# Patient Record
Sex: MALE | Race: WHITE | NOT HISPANIC OR LATINO | Employment: OTHER | URBAN - NONMETROPOLITAN AREA
[De-identification: names, ages, dates, MRNs, and addresses within clinical notes are randomized per-mention and may not be internally consistent; named-entity substitution may affect disease eponyms.]

---

## 2023-06-12 LAB
ALANINE AMINOTRANSFERASE (SGPT) (U/L) IN SER/PLAS: 49 U/L (ref 10–52)
ALBUMIN (G/DL) IN SER/PLAS: 4.3 G/DL (ref 3.4–5)
ALKALINE PHOSPHATASE (U/L) IN SER/PLAS: 53 U/L (ref 33–136)
ANION GAP IN SER/PLAS: 15 MMOL/L (ref 10–20)
ASPARTATE AMINOTRANSFERASE (SGOT) (U/L) IN SER/PLAS: 48 U/L (ref 9–39)
BILIRUBIN TOTAL (MG/DL) IN SER/PLAS: 0.5 MG/DL (ref 0–1.2)
CALCIUM (MG/DL) IN SER/PLAS: 9 MG/DL (ref 8.6–10.3)
CARBON DIOXIDE, TOTAL (MMOL/L) IN SER/PLAS: 28 MMOL/L (ref 21–32)
CHLORIDE (MMOL/L) IN SER/PLAS: 103 MMOL/L (ref 98–107)
CREATININE (MG/DL) IN SER/PLAS: 1.34 MG/DL (ref 0.5–1.3)
ERYTHROCYTE DISTRIBUTION WIDTH (RATIO) BY AUTOMATED COUNT: 13.4 % (ref 11.5–14.5)
ERYTHROCYTE MEAN CORPUSCULAR HEMOGLOBIN CONCENTRATION (G/DL) BY AUTOMATED: 32.7 G/DL (ref 32–36)
ERYTHROCYTE MEAN CORPUSCULAR VOLUME (FL) BY AUTOMATED COUNT: 99 FL (ref 80–100)
ERYTHROCYTES (10*6/UL) IN BLOOD BY AUTOMATED COUNT: 5.21 X10E12/L (ref 4.5–5.9)
GFR MALE: 55 ML/MIN/1.73M2
GLUCOSE (MG/DL) IN SER/PLAS: 196 MG/DL (ref 74–99)
HEMATOCRIT (%) IN BLOOD BY AUTOMATED COUNT: 51.3 % (ref 41–52)
HEMOGLOBIN (G/DL) IN BLOOD: 16.8 G/DL (ref 13.5–17.5)
LEUKOCYTES (10*3/UL) IN BLOOD BY AUTOMATED COUNT: 5.7 X10E9/L (ref 4.4–11.3)
PLATELETS (10*3/UL) IN BLOOD AUTOMATED COUNT: 198 X10E9/L (ref 150–450)
POTASSIUM (MMOL/L) IN SER/PLAS: 4.5 MMOL/L (ref 3.5–5.3)
PROTEIN TOTAL: 7.1 G/DL (ref 6.4–8.2)
SODIUM (MMOL/L) IN SER/PLAS: 141 MMOL/L (ref 136–145)
UREA NITROGEN (MG/DL) IN SER/PLAS: 23 MG/DL (ref 6–23)

## 2023-06-13 LAB
ESTIMATED AVERAGE GLUCOSE FOR HBA1C: 200 MG/DL
HEMOGLOBIN A1C/HEMOGLOBIN TOTAL IN BLOOD: 8.6 %

## 2023-06-16 PROBLEM — E11.37X1 TYPE 2 DIABETES MELLITUS WITH DIABETIC MACULAR EDEMA, RESOLVED FOLLOWING TREATMENT, RIGHT EYE (MULTI): Status: ACTIVE | Noted: 2023-06-16

## 2023-06-16 PROBLEM — R31.9 HEMATURIA: Status: ACTIVE | Noted: 2023-06-16

## 2023-06-16 PROBLEM — S73.192A LABRAL TEAR OF LEFT HIP JOINT: Status: ACTIVE | Noted: 2019-03-26

## 2023-06-16 PROBLEM — G47.30 SLEEP APNEA: Status: ACTIVE | Noted: 2023-06-16

## 2023-06-16 PROBLEM — E66.3 OVERWEIGHT: Status: ACTIVE | Noted: 2023-06-16

## 2023-06-16 PROBLEM — Z95.1 S/P CABG (CORONARY ARTERY BYPASS GRAFT): Status: ACTIVE | Noted: 2023-06-16

## 2023-06-16 PROBLEM — R06.02 SOB (SHORTNESS OF BREATH) ON EXERTION: Status: ACTIVE | Noted: 2017-10-23

## 2023-06-16 PROBLEM — I48.91 ATRIAL FIBRILLATION (MULTI): Status: ACTIVE | Noted: 2023-06-16

## 2023-06-16 PROBLEM — I10 HYPERTENSION: Status: ACTIVE | Noted: 2023-06-16

## 2023-06-16 PROBLEM — R42 DIZZY: Status: ACTIVE | Noted: 2018-07-16

## 2023-06-16 PROBLEM — T78.40XA ALLERGIES: Status: ACTIVE | Noted: 2023-06-16

## 2023-06-16 PROBLEM — R97.20 ELEVATED PSA: Status: ACTIVE | Noted: 2023-06-16

## 2023-06-16 PROBLEM — I48.0 PAROXYSMAL ATRIAL FIBRILLATION (MULTI): Status: ACTIVE | Noted: 2023-06-16

## 2023-06-16 PROBLEM — Z95.0 CARDIAC RESYNCHRONIZATION THERAPY PACEMAKER (CRT-P) IN PLACE: Status: ACTIVE | Noted: 2017-10-23

## 2023-06-16 PROBLEM — I25.10 ARTERIOSCLEROSIS OF CORONARY ARTERY: Status: ACTIVE | Noted: 2023-06-16

## 2023-06-16 PROBLEM — N13.8 BENIGN PROSTATIC HYPERPLASIA WITH URINARY OBSTRUCTION AND OTHER LOWER URINARY TRACT SYMPTOMS: Status: ACTIVE | Noted: 2023-06-16

## 2023-06-16 PROBLEM — R06.09 DOE (DYSPNEA ON EXERTION): Chronic | Status: ACTIVE | Noted: 2017-06-15

## 2023-06-16 PROBLEM — R25.2 SPASM: Status: ACTIVE | Noted: 2023-06-16

## 2023-06-16 PROBLEM — G62.9 NEUROPATHY: Status: ACTIVE | Noted: 2023-06-16

## 2023-06-16 PROBLEM — R35.1 NOCTURIA: Status: ACTIVE | Noted: 2023-06-16

## 2023-06-16 PROBLEM — K21.9 GERD (GASTROESOPHAGEAL REFLUX DISEASE): Status: ACTIVE | Noted: 2023-06-16

## 2023-06-16 PROBLEM — N52.01 ERECTILE DYSFUNCTION DUE TO ARTERIAL INSUFFICIENCY: Status: ACTIVE | Noted: 2021-09-23

## 2023-06-16 PROBLEM — N40.0 BPH (BENIGN PROSTATIC HYPERPLASIA): Status: ACTIVE | Noted: 2023-06-16

## 2023-06-16 PROBLEM — I44.2 COMPLETE HEART BLOCK (MULTI): Status: ACTIVE | Noted: 2019-11-08

## 2023-06-16 PROBLEM — R32 URINARY INCONTINENCE: Status: ACTIVE | Noted: 2023-06-16

## 2023-06-16 PROBLEM — I21.4 NSTEMI (NON-ST ELEVATED MYOCARDIAL INFARCTION) (MULTI): Status: ACTIVE | Noted: 2020-06-07

## 2023-06-16 PROBLEM — Z95.0 STATUS POST BIVENTRICULAR PACEMAKER: Status: ACTIVE | Noted: 2019-11-08

## 2023-06-16 PROBLEM — R39.15 URINARY URGENCY: Status: ACTIVE | Noted: 2023-06-16

## 2023-06-16 PROBLEM — R35.0 URINARY FREQUENCY: Status: ACTIVE | Noted: 2023-06-16

## 2023-06-16 PROBLEM — G47.33 OBSTRUCTIVE SLEEP APNEA SYNDROME: Status: ACTIVE | Noted: 2023-06-16

## 2023-06-16 PROBLEM — E11.9 DIABETES MELLITUS, TYPE 2 (MULTI): Status: ACTIVE | Noted: 2023-06-16

## 2023-06-16 PROBLEM — N40.1 BENIGN PROSTATIC HYPERPLASIA WITH URINARY OBSTRUCTION AND OTHER LOWER URINARY TRACT SYMPTOMS: Status: ACTIVE | Noted: 2023-06-16

## 2023-06-16 PROBLEM — E78.5 HYPERLIPIDEMIA: Status: ACTIVE | Noted: 2023-06-16

## 2023-06-16 PROBLEM — N52.9 ERECTILE DYSFUNCTION: Status: ACTIVE | Noted: 2023-06-16

## 2023-06-16 PROBLEM — C61 MALIGNANT NEOPLASM OF PROSTATE (MULTI): Status: ACTIVE | Noted: 2021-06-10

## 2023-06-16 RX ORDER — DAPAGLIFLOZIN 10 MG/1
1 TABLET, FILM COATED ORAL
COMMUNITY
Start: 2022-05-09 | End: 2023-06-19 | Stop reason: ALTCHOICE

## 2023-06-16 RX ORDER — RANOLAZINE 500 MG/1
1 TABLET, EXTENDED RELEASE ORAL EVERY 12 HOURS
COMMUNITY
End: 2023-07-03 | Stop reason: SDUPTHER

## 2023-06-16 RX ORDER — TRIAMCINOLONE ACETONIDE 1 MG/G
CREAM TOPICAL
COMMUNITY
Start: 2020-01-15 | End: 2023-09-18 | Stop reason: SDUPTHER

## 2023-06-16 RX ORDER — BUMETANIDE 1 MG/1
1 TABLET ORAL DAILY
COMMUNITY
Start: 2023-02-08 | End: 2023-07-03 | Stop reason: SDUPTHER

## 2023-06-16 RX ORDER — OMEPRAZOLE 20 MG/1
1 CAPSULE, DELAYED RELEASE ORAL DAILY
COMMUNITY
End: 2023-07-03 | Stop reason: SDUPTHER

## 2023-06-16 RX ORDER — EPINEPHRINE 0.3 MG/.3ML
0.3 INJECTION SUBCUTANEOUS AS NEEDED
COMMUNITY
Start: 2021-11-22

## 2023-06-16 RX ORDER — METOPROLOL TARTRATE 25 MG/1
1 TABLET, FILM COATED ORAL 2 TIMES DAILY
COMMUNITY
End: 2023-07-03 | Stop reason: SDUPTHER

## 2023-06-16 RX ORDER — CLOPIDOGREL BISULFATE 75 MG/1
1 TABLET ORAL DAILY
COMMUNITY
End: 2023-07-03 | Stop reason: SDUPTHER

## 2023-06-16 RX ORDER — GABAPENTIN 300 MG/1
1 CAPSULE ORAL 3 TIMES DAILY
COMMUNITY
End: 2023-07-03

## 2023-06-16 RX ORDER — CIPROFLOXACIN 500 MG/1
500 TABLET ORAL EVERY 12 HOURS
COMMUNITY
Start: 2022-12-31

## 2023-06-16 RX ORDER — NITROGLYCERIN 0.4 MG/1
1 TABLET SUBLINGUAL AS NEEDED
COMMUNITY

## 2023-06-16 RX ORDER — LOSARTAN POTASSIUM 25 MG/1
1 TABLET ORAL DAILY
COMMUNITY
End: 2023-09-18 | Stop reason: SDUPTHER

## 2023-06-16 RX ORDER — METFORMIN HYDROCHLORIDE 750 MG/1
2 TABLET, EXTENDED RELEASE ORAL DAILY
COMMUNITY
End: 2023-06-30 | Stop reason: SDUPTHER

## 2023-06-16 RX ORDER — ISOSORBIDE MONONITRATE 60 MG/1
1 TABLET, EXTENDED RELEASE ORAL DAILY
COMMUNITY
End: 2023-07-14 | Stop reason: SDUPTHER

## 2023-06-16 RX ORDER — GLIPIZIDE 5 MG/1
2 TABLET ORAL 2 TIMES DAILY
COMMUNITY
Start: 2021-11-02 | End: 2023-09-18 | Stop reason: SDUPTHER

## 2023-06-16 RX ORDER — IBUPROFEN 200 MG
CAPSULE ORAL 2 TIMES DAILY
COMMUNITY
Start: 2020-05-29 | End: 2024-04-25 | Stop reason: SDUPTHER

## 2023-06-16 RX ORDER — CYCLOBENZAPRINE HCL 10 MG
1 TABLET ORAL 3 TIMES DAILY PRN
COMMUNITY

## 2023-06-19 ENCOUNTER — OFFICE VISIT (OUTPATIENT)
Dept: PRIMARY CARE | Facility: CLINIC | Age: 78
End: 2023-06-19
Payer: MEDICARE

## 2023-06-19 VITALS
BODY MASS INDEX: 37.84 KG/M2 | HEIGHT: 70 IN | SYSTOLIC BLOOD PRESSURE: 114 MMHG | WEIGHT: 264.3 LBS | OXYGEN SATURATION: 96 % | DIASTOLIC BLOOD PRESSURE: 64 MMHG | HEART RATE: 70 BPM

## 2023-06-19 DIAGNOSIS — C61 MALIGNANT NEOPLASM OF PROSTATE (MULTI): ICD-10-CM

## 2023-06-19 DIAGNOSIS — Z95.0 CARDIAC RESYNCHRONIZATION THERAPY PACEMAKER (CRT-P) IN PLACE: ICD-10-CM

## 2023-06-19 DIAGNOSIS — M17.11 ARTHRITIS OF RIGHT KNEE: ICD-10-CM

## 2023-06-19 DIAGNOSIS — E78.2 MIXED HYPERLIPIDEMIA: ICD-10-CM

## 2023-06-19 DIAGNOSIS — Z79.4 TYPE 2 DIABETES MELLITUS WITH DIABETIC MACULAR EDEMA OF RIGHT EYE RESOLVED AFTER TREATMENT, WITH LONG-TERM CURRENT USE OF INSULIN (MULTI): ICD-10-CM

## 2023-06-19 DIAGNOSIS — E11.37X1 TYPE 2 DIABETES MELLITUS WITH DIABETIC MACULAR EDEMA OF RIGHT EYE RESOLVED AFTER TREATMENT, WITH LONG-TERM CURRENT USE OF INSULIN (MULTI): ICD-10-CM

## 2023-06-19 DIAGNOSIS — I48.0 PAROXYSMAL ATRIAL FIBRILLATION (MULTI): Primary | ICD-10-CM

## 2023-06-19 PROCEDURE — 3078F DIAST BP <80 MM HG: CPT | Performed by: FAMILY MEDICINE

## 2023-06-19 PROCEDURE — 99214 OFFICE O/P EST MOD 30 MIN: CPT | Performed by: FAMILY MEDICINE

## 2023-06-19 PROCEDURE — 1160F RVW MEDS BY RX/DR IN RCRD: CPT | Performed by: FAMILY MEDICINE

## 2023-06-19 PROCEDURE — 3074F SYST BP LT 130 MM HG: CPT | Performed by: FAMILY MEDICINE

## 2023-06-19 PROCEDURE — 1159F MED LIST DOCD IN RCRD: CPT | Performed by: FAMILY MEDICINE

## 2023-06-19 RX ORDER — ALIROCUMAB 75 MG/ML
1 INJECTION, SOLUTION SUBCUTANEOUS
COMMUNITY
Start: 2022-07-01

## 2023-06-19 RX ORDER — ALBUTEROL SULFATE 90 UG/1
2 AEROSOL, METERED RESPIRATORY (INHALATION) 2 TIMES DAILY
COMMUNITY
Start: 2022-06-24

## 2023-06-19 RX ORDER — DAPAGLIFLOZIN 5 MG/1
5 TABLET, FILM COATED ORAL DAILY
COMMUNITY
End: 2023-12-11 | Stop reason: SDUPTHER

## 2023-06-19 RX ORDER — DICLOFENAC SODIUM 10 MG/G
GEL TOPICAL
COMMUNITY
Start: 2023-05-12 | End: 2023-06-19 | Stop reason: SDUPTHER

## 2023-06-19 RX ORDER — DICLOFENAC SODIUM 10 MG/G
4 GEL TOPICAL 2 TIMES DAILY PRN
Qty: 100 G | Refills: 2 | Status: SHIPPED | OUTPATIENT
Start: 2023-06-19

## 2023-06-19 RX ORDER — INSULIN DETEMIR 100 [IU]/ML
60 INJECTION, SOLUTION SUBCUTANEOUS EVERY MORNING
COMMUNITY
Start: 2023-06-04 | End: 2023-11-10 | Stop reason: SDUPTHER

## 2023-06-19 NOTE — PROGRESS NOTES
"Subjective   Patient ID: Shankar Oswald is a 78 y.o. male who presents for Follow-up (4 mo rev labs).    HPI   Dm Avg 170s, no lows, currently on Lantus 60 units and oral agents including Farxiga glipizide metformin.  Sugars have been very good lately and is falling A1c may be lacking his current status.  As such plan for A1c in 3 months  Cad no angina but he is noted to have decreasing exercise tolerance and capacity over the last 6 months and over the last 3 months.  At this point sometimes that he has to stop and 50 feet because of pain in both hips as well as shortness of breath.  He is to call cardiology to be seen this week or next week a he has an echo most recently showing a normal ejection fraction though he does have more than a dozen stents in place.  Although he is having no chest pain this decreased capacity and tolerance with associated dyspnea likely represents occlusive disease.   GERD-Takes PPI daily with no breakthrough symptoms.  Reviewed dietary, caffeine, tobacco, alcohol, and NSAID avoidance. No dyspepsia, dysphagia, reflux, melena, or abdominal pain.  Paroxysmal atrial fibrillation managed with pacer  Hyperlipidemia on Praluent injection as intolerant to statin  Review of Systems  As per HPI  Objective   /64   Pulse 70   Ht 1.778 m (5' 10\")   Wt 120 kg (264 lb 4.8 oz)   SpO2 96%   BMI 37.92 kg/m²     Physical Exam  No bruit thyroid nontender.  Heart appears regular.  Lungs clear.  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

## 2023-06-30 DIAGNOSIS — E11.37X1 TYPE 2 DIABETES MELLITUS WITH DIABETIC MACULAR EDEMA OF RIGHT EYE RESOLVED AFTER TREATMENT, WITH LONG-TERM CURRENT USE OF INSULIN (MULTI): ICD-10-CM

## 2023-06-30 DIAGNOSIS — Z79.4 TYPE 2 DIABETES MELLITUS WITH DIABETIC MACULAR EDEMA OF RIGHT EYE RESOLVED AFTER TREATMENT, WITH LONG-TERM CURRENT USE OF INSULIN (MULTI): ICD-10-CM

## 2023-06-30 RX ORDER — METFORMIN HYDROCHLORIDE 750 MG/1
1500 TABLET, EXTENDED RELEASE ORAL DAILY
Qty: 180 TABLET | Refills: 3 | Status: SHIPPED | OUTPATIENT
Start: 2023-06-30 | End: 2023-09-18 | Stop reason: SDUPTHER

## 2023-07-02 DIAGNOSIS — G62.9 POLYNEUROPATHY, UNSPECIFIED: ICD-10-CM

## 2023-07-02 DIAGNOSIS — I10 HYPERTENSION, UNSPECIFIED TYPE: ICD-10-CM

## 2023-07-02 DIAGNOSIS — K21.9 GASTROESOPHAGEAL REFLUX DISEASE, UNSPECIFIED WHETHER ESOPHAGITIS PRESENT: ICD-10-CM

## 2023-07-02 DIAGNOSIS — Z00.00 ROUTINE HEALTH MAINTENANCE: ICD-10-CM

## 2023-07-02 DIAGNOSIS — I25.10 ARTERIOSCLEROSIS OF CORONARY ARTERY: ICD-10-CM

## 2023-07-03 RX ORDER — METOPROLOL TARTRATE 25 MG/1
25 TABLET, FILM COATED ORAL 2 TIMES DAILY
Qty: 180 TABLET | Refills: 3 | Status: SHIPPED | OUTPATIENT
Start: 2023-07-03 | End: 2023-09-18 | Stop reason: SDUPTHER

## 2023-07-03 RX ORDER — RANOLAZINE 500 MG/1
500 TABLET, EXTENDED RELEASE ORAL EVERY 12 HOURS
Qty: 180 TABLET | Refills: 3 | Status: SHIPPED | OUTPATIENT
Start: 2023-07-03 | End: 2023-09-18 | Stop reason: SDUPTHER

## 2023-07-03 RX ORDER — GABAPENTIN 300 MG/1
CAPSULE ORAL
Qty: 270 CAPSULE | Refills: 3 | Status: SHIPPED | OUTPATIENT
Start: 2023-07-03 | End: 2023-09-18 | Stop reason: SDUPTHER

## 2023-07-03 RX ORDER — BUMETANIDE 1 MG/1
1 TABLET ORAL DAILY
Qty: 90 TABLET | Refills: 3 | Status: SHIPPED | OUTPATIENT
Start: 2023-07-03 | End: 2023-09-18 | Stop reason: SDUPTHER

## 2023-07-03 RX ORDER — OMEPRAZOLE 20 MG/1
20 CAPSULE, DELAYED RELEASE ORAL DAILY
Qty: 90 CAPSULE | Refills: 3 | Status: SHIPPED | OUTPATIENT
Start: 2023-07-03 | End: 2023-09-18 | Stop reason: SDUPTHER

## 2023-07-03 RX ORDER — CLOPIDOGREL BISULFATE 75 MG/1
75 TABLET ORAL DAILY
Qty: 90 TABLET | Refills: 3 | Status: SHIPPED | OUTPATIENT
Start: 2023-07-03 | End: 2023-09-18 | Stop reason: SDUPTHER

## 2023-07-14 DIAGNOSIS — I48.0 PAROXYSMAL ATRIAL FIBRILLATION (MULTI): ICD-10-CM

## 2023-07-14 RX ORDER — ISOSORBIDE MONONITRATE 60 MG/1
60 TABLET, EXTENDED RELEASE ORAL DAILY
Qty: 90 TABLET | Refills: 3 | Status: SHIPPED | OUTPATIENT
Start: 2023-07-14 | End: 2023-09-18 | Stop reason: SDUPTHER

## 2023-09-11 ENCOUNTER — LAB (OUTPATIENT)
Dept: LAB | Facility: LAB | Age: 78
End: 2023-09-11
Payer: MEDICARE

## 2023-09-11 DIAGNOSIS — E78.2 MIXED HYPERLIPIDEMIA: ICD-10-CM

## 2023-09-11 DIAGNOSIS — Z79.4 TYPE 2 DIABETES MELLITUS WITH DIABETIC MACULAR EDEMA OF RIGHT EYE RESOLVED AFTER TREATMENT, WITH LONG-TERM CURRENT USE OF INSULIN (MULTI): ICD-10-CM

## 2023-09-11 DIAGNOSIS — E11.37X1 TYPE 2 DIABETES MELLITUS WITH DIABETIC MACULAR EDEMA OF RIGHT EYE RESOLVED AFTER TREATMENT, WITH LONG-TERM CURRENT USE OF INSULIN (MULTI): ICD-10-CM

## 2023-09-11 LAB
ALANINE AMINOTRANSFERASE (SGPT) (U/L) IN SER/PLAS: 44 U/L (ref 10–52)
ALBUMIN (G/DL) IN SER/PLAS: 4.2 G/DL (ref 3.4–5)
ALKALINE PHOSPHATASE (U/L) IN SER/PLAS: 55 U/L (ref 33–136)
ANION GAP IN SER/PLAS: 15 MMOL/L (ref 10–20)
ASPARTATE AMINOTRANSFERASE (SGOT) (U/L) IN SER/PLAS: 43 U/L (ref 9–39)
BILIRUBIN TOTAL (MG/DL) IN SER/PLAS: 0.4 MG/DL (ref 0–1.2)
CALCIUM (MG/DL) IN SER/PLAS: 9.4 MG/DL (ref 8.6–10.3)
CARBON DIOXIDE, TOTAL (MMOL/L) IN SER/PLAS: 25 MMOL/L (ref 21–32)
CHLORIDE (MMOL/L) IN SER/PLAS: 104 MMOL/L (ref 98–107)
CHOLESTEROL (MG/DL) IN SER/PLAS: 194 MG/DL (ref 0–199)
CHOLESTEROL IN HDL (MG/DL) IN SER/PLAS: 40 MG/DL
CHOLESTEROL/HDL RATIO: 4.9
CREATININE (MG/DL) IN SER/PLAS: 1.18 MG/DL (ref 0.5–1.3)
ERYTHROCYTE DISTRIBUTION WIDTH (RATIO) BY AUTOMATED COUNT: 14.4 % (ref 11.5–14.5)
ERYTHROCYTE MEAN CORPUSCULAR HEMOGLOBIN CONCENTRATION (G/DL) BY AUTOMATED: 32.3 G/DL (ref 32–36)
ERYTHROCYTE MEAN CORPUSCULAR VOLUME (FL) BY AUTOMATED COUNT: 100 FL (ref 80–100)
ERYTHROCYTES (10*6/UL) IN BLOOD BY AUTOMATED COUNT: 5.05 X10E12/L (ref 4.5–5.9)
ESTIMATED AVERAGE GLUCOSE FOR HBA1C: 194 MG/DL
GFR MALE: 64 ML/MIN/1.73M2
GLUCOSE (MG/DL) IN SER/PLAS: 163 MG/DL (ref 74–99)
HEMATOCRIT (%) IN BLOOD BY AUTOMATED COUNT: 50.7 % (ref 41–52)
HEMOGLOBIN (G/DL) IN BLOOD: 16.4 G/DL (ref 13.5–17.5)
HEMOGLOBIN A1C/HEMOGLOBIN TOTAL IN BLOOD: 8.4 %
LDL: 85 MG/DL (ref 0–99)
LEUKOCYTES (10*3/UL) IN BLOOD BY AUTOMATED COUNT: 5.7 X10E9/L (ref 4.4–11.3)
NON HDL CHOLESTEROL: 154 MG/DL
PLATELETS (10*3/UL) IN BLOOD AUTOMATED COUNT: 189 X10E9/L (ref 150–450)
POTASSIUM (MMOL/L) IN SER/PLAS: 4.5 MMOL/L (ref 3.5–5.3)
PROTEIN TOTAL: 7.1 G/DL (ref 6.4–8.2)
SODIUM (MMOL/L) IN SER/PLAS: 139 MMOL/L (ref 136–145)
TRIGLYCERIDE (MG/DL) IN SER/PLAS: 347 MG/DL (ref 0–149)
UREA NITROGEN (MG/DL) IN SER/PLAS: 20 MG/DL (ref 6–23)
VLDL: 69 MG/DL (ref 0–40)

## 2023-09-11 PROCEDURE — 80061 LIPID PANEL: CPT

## 2023-09-11 PROCEDURE — 85027 COMPLETE CBC AUTOMATED: CPT

## 2023-09-11 PROCEDURE — 36415 COLL VENOUS BLD VENIPUNCTURE: CPT

## 2023-09-11 PROCEDURE — 80053 COMPREHEN METABOLIC PANEL: CPT

## 2023-09-11 PROCEDURE — 83036 HEMOGLOBIN GLYCOSYLATED A1C: CPT

## 2023-09-18 ENCOUNTER — OFFICE VISIT (OUTPATIENT)
Dept: PRIMARY CARE | Facility: CLINIC | Age: 78
End: 2023-09-18
Payer: MEDICARE

## 2023-09-18 ENCOUNTER — APPOINTMENT (OUTPATIENT)
Dept: URBAN - METROPOLITAN AREA CLINIC 204 | Age: 78
Setting detail: DERMATOLOGY
End: 2023-09-19

## 2023-09-18 VITALS
SYSTOLIC BLOOD PRESSURE: 120 MMHG | WEIGHT: 269.4 LBS | HEIGHT: 70 IN | BODY MASS INDEX: 38.57 KG/M2 | OXYGEN SATURATION: 97 % | DIASTOLIC BLOOD PRESSURE: 62 MMHG | HEART RATE: 70 BPM

## 2023-09-18 DIAGNOSIS — E11.37X1 TYPE 2 DIABETES MELLITUS WITH DIABETIC MACULAR EDEMA OF RIGHT EYE RESOLVED AFTER TREATMENT, WITH LONG-TERM CURRENT USE OF INSULIN (MULTI): ICD-10-CM

## 2023-09-18 DIAGNOSIS — I25.10 ARTERIOSCLEROSIS OF CORONARY ARTERY: ICD-10-CM

## 2023-09-18 DIAGNOSIS — I48.0 PAROXYSMAL ATRIAL FIBRILLATION (MULTI): ICD-10-CM

## 2023-09-18 DIAGNOSIS — Z00.00 ROUTINE HEALTH MAINTENANCE: ICD-10-CM

## 2023-09-18 DIAGNOSIS — R21 RASH: Primary | ICD-10-CM

## 2023-09-18 DIAGNOSIS — G62.9 POLYNEUROPATHY, UNSPECIFIED: ICD-10-CM

## 2023-09-18 DIAGNOSIS — K21.9 GASTROESOPHAGEAL REFLUX DISEASE, UNSPECIFIED WHETHER ESOPHAGITIS PRESENT: ICD-10-CM

## 2023-09-18 DIAGNOSIS — I10 HYPERTENSION, UNSPECIFIED TYPE: ICD-10-CM

## 2023-09-18 DIAGNOSIS — Z79.4 TYPE 2 DIABETES MELLITUS WITH DIABETIC MACULAR EDEMA OF RIGHT EYE RESOLVED AFTER TREATMENT, WITH LONG-TERM CURRENT USE OF INSULIN (MULTI): ICD-10-CM

## 2023-09-18 DIAGNOSIS — M17.11 ARTHRITIS OF RIGHT KNEE: ICD-10-CM

## 2023-09-18 DIAGNOSIS — E78.2 MIXED HYPERLIPIDEMIA: ICD-10-CM

## 2023-09-18 PROCEDURE — OTHER MIPS QUALITY: OTHER

## 2023-09-18 PROCEDURE — 1160F RVW MEDS BY RX/DR IN RCRD: CPT | Performed by: FAMILY MEDICINE

## 2023-09-18 PROCEDURE — 3074F SYST BP LT 130 MM HG: CPT | Performed by: FAMILY MEDICINE

## 2023-09-18 PROCEDURE — 1159F MED LIST DOCD IN RCRD: CPT | Performed by: FAMILY MEDICINE

## 2023-09-18 PROCEDURE — 17003 DESTRUCT PREMALG LES 2-14: CPT | Mod: 59

## 2023-09-18 PROCEDURE — 99213 OFFICE O/P EST LOW 20 MIN: CPT | Mod: 25

## 2023-09-18 PROCEDURE — 17110 DESTRUCT B9 LESION 1-14: CPT

## 2023-09-18 PROCEDURE — 3078F DIAST BP <80 MM HG: CPT | Performed by: FAMILY MEDICINE

## 2023-09-18 PROCEDURE — 17000 DESTRUCT PREMALG LESION: CPT | Mod: 59

## 2023-09-18 PROCEDURE — 99214 OFFICE O/P EST MOD 30 MIN: CPT | Performed by: FAMILY MEDICINE

## 2023-09-18 RX ORDER — LOSARTAN POTASSIUM 25 MG/1
25 TABLET ORAL DAILY
Qty: 90 TABLET | Refills: 3 | Status: SHIPPED | OUTPATIENT
Start: 2023-09-18 | End: 2024-09-17

## 2023-09-18 RX ORDER — BUMETANIDE 1 MG/1
1 TABLET ORAL DAILY
Qty: 90 TABLET | Refills: 3 | Status: SHIPPED | OUTPATIENT
Start: 2023-09-18 | End: 2024-09-17

## 2023-09-18 RX ORDER — RANOLAZINE 500 MG/1
500 TABLET, EXTENDED RELEASE ORAL EVERY 12 HOURS
Qty: 180 TABLET | Refills: 3 | Status: SHIPPED | OUTPATIENT
Start: 2023-09-18 | End: 2024-09-17

## 2023-09-18 RX ORDER — GLIPIZIDE 5 MG/1
10 TABLET ORAL 2 TIMES DAILY
Qty: 360 TABLET | Refills: 3 | Status: SHIPPED | OUTPATIENT
Start: 2023-09-18 | End: 2024-09-17

## 2023-09-18 RX ORDER — WARFARIN 2.5 MG/1
2.5 TABLET ORAL EVERY EVENING
Qty: 90 TABLET | Refills: 3 | Status: SHIPPED | OUTPATIENT
Start: 2023-09-18 | End: 2024-09-17

## 2023-09-18 RX ORDER — METFORMIN HYDROCHLORIDE 750 MG/1
1500 TABLET, EXTENDED RELEASE ORAL DAILY
Qty: 180 TABLET | Refills: 3 | Status: SHIPPED | OUTPATIENT
Start: 2023-09-18 | End: 2024-09-17

## 2023-09-18 RX ORDER — GABAPENTIN 300 MG/1
300 CAPSULE ORAL 3 TIMES DAILY
Qty: 270 CAPSULE | Refills: 3 | Status: SHIPPED | OUTPATIENT
Start: 2023-09-18 | End: 2024-09-17

## 2023-09-18 RX ORDER — CLOPIDOGREL BISULFATE 75 MG/1
75 TABLET ORAL DAILY
Qty: 90 TABLET | Refills: 3 | Status: SHIPPED | OUTPATIENT
Start: 2023-09-18 | End: 2024-09-17

## 2023-09-18 RX ORDER — METOPROLOL TARTRATE 25 MG/1
25 TABLET, FILM COATED ORAL 2 TIMES DAILY
Qty: 180 TABLET | Refills: 3 | Status: SHIPPED | OUTPATIENT
Start: 2023-09-18 | End: 2024-09-17

## 2023-09-18 RX ORDER — OMEPRAZOLE 20 MG/1
20 CAPSULE, DELAYED RELEASE ORAL DAILY
Qty: 90 CAPSULE | Refills: 3 | Status: SHIPPED | OUTPATIENT
Start: 2023-09-18 | End: 2024-09-17

## 2023-09-18 RX ORDER — WARFARIN 2.5 MG/1
2.5 TABLET ORAL EVERY EVENING
COMMUNITY
Start: 2022-08-29 | End: 2023-09-18 | Stop reason: SDUPTHER

## 2023-09-18 RX ORDER — WARFARIN SODIUM 5 MG/1
5 TABLET ORAL EVERY OTHER DAY
COMMUNITY
End: 2023-09-18 | Stop reason: SDUPTHER

## 2023-09-18 RX ORDER — WARFARIN SODIUM 5 MG/1
TABLET ORAL
Qty: 60 TABLET | Refills: 11 | Status: SHIPPED | OUTPATIENT
Start: 2023-09-18

## 2023-09-18 RX ORDER — ISOSORBIDE MONONITRATE 60 MG/1
60 TABLET, EXTENDED RELEASE ORAL DAILY
Qty: 90 TABLET | Refills: 3 | Status: SHIPPED | OUTPATIENT
Start: 2023-09-18 | End: 2024-09-17

## 2023-09-18 RX ORDER — TRIAMCINOLONE ACETONIDE 1 MG/G
CREAM TOPICAL 3 TIMES DAILY
Qty: 453.6 G | Refills: 11 | Status: SHIPPED | OUTPATIENT
Start: 2023-09-18 | End: 2024-09-17

## 2023-09-18 NOTE — PROCEDURE: MIPS QUALITY
Detail Level: Detailed
Additional Notes: Documentation for MIPS  purposes only. Full Patient visit note from paper chart is available for review and also scanned in EMA chart.
Quality 226: Preventive Care And Screening: Tobacco Use: Screening And Cessation Intervention: Patient screened for tobacco use, is a smoker AND received Cessation Counseling within the Previous 12 Months
Additional Notes: Patient received verbal cessation counseling (3 min or less)

## 2023-09-18 NOTE — PROGRESS NOTES
"Subjective   Patient ID: Shankar Oswald is a 78 y.o. male who presents for Follow-up (3 mo rev labs).    HPI   Since the last office visit there have been no interval operations, hospitalizations, important illnesses or injuries.    Dm- bs avg 153, treneding down , n1 month agoavg 180  He gets farxiga for free at 5mg.  A1c remains at 8.4  Review of Systems  Has stable reduced exercise tolerance and capacity from known heart disease  Objective   /62   Pulse 70   Ht 1.778 m (5' 10\")   Wt 122 kg (269 lb 6.4 oz)   SpO2 97%   BMI 38.65 kg/m²     Physical Exam  Heart regular lungs clear abdomen obese  Assessment/Plan   Problem List Items Addressed This Visit       Arteriosclerosis of coronary artery    Relevant Medications    clopidogrel (Plavix) 75 mg tablet    isosorbide mononitrate ER (Imdur) 60 mg 24 hr tablet    metoprolol tartrate (Lopressor) 25 mg tablet    ranolazine (Ranexa) 500 mg 12 hr tablet    Paroxysmal atrial fibrillation (CMS/HCC)    Relevant Medications    clopidogrel (Plavix) 75 mg tablet    isosorbide mononitrate ER (Imdur) 60 mg 24 hr tablet    metoprolol tartrate (Lopressor) 25 mg tablet    ranolazine (Ranexa) 500 mg 12 hr tablet    warfarin (Coumadin) 2.5 mg tablet    warfarin (Coumadin) 5 mg tablet    Diabetes mellitus, type 2 (CMS/HCC)    Relevant Medications    metFORMIN XR (Glucophage-XR) 750 mg 24 hr tablet    glipiZIDE (Glucotrol) 5 mg tablet    Other Relevant Orders    Hemoglobin A1C    Follow Up In Primary Care    GERD (gastroesophageal reflux disease)    Relevant Medications    omeprazole (PriLOSEC) 20 mg DR capsule    Hyperlipidemia    Hypertension    Relevant Medications    bumetanide (Bumex) 1 mg tablet    metoprolol tartrate (Lopressor) 25 mg tablet    losartan (Cozaar) 25 mg tablet     Other Visit Diagnoses       Rash    -  Primary    Relevant Medications    triamcinolone (Kenalog) 0.1 % cream    Arthritis of right knee        Polyneuropathy, unspecified        Relevant " Medications    gabapentin (Neurontin) 300 mg capsule    Routine health maintenance        Relevant Medications    ranolazine (Ranexa) 500 mg 12 hr tablet

## 2023-11-10 DIAGNOSIS — E11.37X1 TYPE 2 DIABETES MELLITUS WITH DIABETIC MACULAR EDEMA OF RIGHT EYE RESOLVED AFTER TREATMENT, WITH LONG-TERM CURRENT USE OF INSULIN (MULTI): ICD-10-CM

## 2023-11-10 DIAGNOSIS — Z79.4 TYPE 2 DIABETES MELLITUS WITH DIABETIC MACULAR EDEMA OF RIGHT EYE RESOLVED AFTER TREATMENT, WITH LONG-TERM CURRENT USE OF INSULIN (MULTI): ICD-10-CM

## 2023-11-10 RX ORDER — INSULIN DETEMIR 100 [IU]/ML
60 INJECTION, SOLUTION SUBCUTANEOUS EVERY MORNING
Qty: 54 ML | Refills: 3 | Status: SHIPPED | OUTPATIENT
Start: 2023-11-10 | End: 2024-02-26 | Stop reason: WASHOUT

## 2023-12-11 DIAGNOSIS — E11.37X1 TYPE 2 DIABETES MELLITUS WITH DIABETIC MACULAR EDEMA OF RIGHT EYE RESOLVED AFTER TREATMENT, WITH LONG-TERM CURRENT USE OF INSULIN (MULTI): ICD-10-CM

## 2023-12-11 DIAGNOSIS — Z79.4 TYPE 2 DIABETES MELLITUS WITH DIABETIC MACULAR EDEMA OF RIGHT EYE RESOLVED AFTER TREATMENT, WITH LONG-TERM CURRENT USE OF INSULIN (MULTI): ICD-10-CM

## 2023-12-11 RX ORDER — DAPAGLIFLOZIN 10 MG/1
10 TABLET, FILM COATED ORAL DAILY
Qty: 90 TABLET | Refills: 3 | Status: SHIPPED | OUTPATIENT
Start: 2023-12-11 | End: 2024-12-10

## 2023-12-15 ENCOUNTER — LAB (OUTPATIENT)
Dept: LAB | Facility: LAB | Age: 78
End: 2023-12-15
Payer: MEDICARE

## 2023-12-15 DIAGNOSIS — E11.37X1 TYPE 2 DIABETES MELLITUS WITH DIABETIC MACULAR EDEMA OF RIGHT EYE RESOLVED AFTER TREATMENT, WITH LONG-TERM CURRENT USE OF INSULIN (MULTI): ICD-10-CM

## 2023-12-15 DIAGNOSIS — Z79.4 TYPE 2 DIABETES MELLITUS WITH DIABETIC MACULAR EDEMA OF RIGHT EYE RESOLVED AFTER TREATMENT, WITH LONG-TERM CURRENT USE OF INSULIN (MULTI): ICD-10-CM

## 2023-12-15 LAB
EST. AVERAGE GLUCOSE BLD GHB EST-MCNC: 177 MG/DL
HBA1C MFR BLD: 7.8 %

## 2023-12-15 PROCEDURE — 83036 HEMOGLOBIN GLYCOSYLATED A1C: CPT

## 2023-12-15 PROCEDURE — 36415 COLL VENOUS BLD VENIPUNCTURE: CPT

## 2023-12-22 ENCOUNTER — OFFICE VISIT (OUTPATIENT)
Dept: PRIMARY CARE | Facility: CLINIC | Age: 78
End: 2023-12-22
Payer: MEDICARE

## 2023-12-22 VITALS
OXYGEN SATURATION: 96 % | BODY MASS INDEX: 38.84 KG/M2 | HEART RATE: 71 BPM | SYSTOLIC BLOOD PRESSURE: 136 MMHG | HEIGHT: 70 IN | DIASTOLIC BLOOD PRESSURE: 76 MMHG | WEIGHT: 271.3 LBS

## 2023-12-22 DIAGNOSIS — I10 PRIMARY HYPERTENSION: ICD-10-CM

## 2023-12-22 DIAGNOSIS — E11.37X1 TYPE 2 DIABETES MELLITUS WITH DIABETIC MACULAR EDEMA OF RIGHT EYE RESOLVED AFTER TREATMENT, WITH LONG-TERM CURRENT USE OF INSULIN (MULTI): ICD-10-CM

## 2023-12-22 DIAGNOSIS — Z79.4 TYPE 2 DIABETES MELLITUS WITH DIABETIC MACULAR EDEMA OF RIGHT EYE RESOLVED AFTER TREATMENT, WITH LONG-TERM CURRENT USE OF INSULIN (MULTI): ICD-10-CM

## 2023-12-22 DIAGNOSIS — E78.2 MIXED HYPERLIPIDEMIA: ICD-10-CM

## 2023-12-22 DIAGNOSIS — Z00.00 ROUTINE GENERAL MEDICAL EXAMINATION AT HEALTH CARE FACILITY: Primary | ICD-10-CM

## 2023-12-22 PROCEDURE — 99214 OFFICE O/P EST MOD 30 MIN: CPT | Performed by: FAMILY MEDICINE

## 2023-12-22 PROCEDURE — 3075F SYST BP GE 130 - 139MM HG: CPT | Performed by: FAMILY MEDICINE

## 2023-12-22 PROCEDURE — G0439 PPPS, SUBSEQ VISIT: HCPCS | Performed by: FAMILY MEDICINE

## 2023-12-22 PROCEDURE — 1170F FXNL STATUS ASSESSED: CPT | Performed by: FAMILY MEDICINE

## 2023-12-22 PROCEDURE — 3078F DIAST BP <80 MM HG: CPT | Performed by: FAMILY MEDICINE

## 2023-12-22 PROCEDURE — 1159F MED LIST DOCD IN RCRD: CPT | Performed by: FAMILY MEDICINE

## 2023-12-22 PROCEDURE — 1160F RVW MEDS BY RX/DR IN RCRD: CPT | Performed by: FAMILY MEDICINE

## 2023-12-22 RX ORDER — INSULIN GLARGINE 100 [IU]/ML
60 INJECTION, SOLUTION SUBCUTANEOUS EVERY MORNING
Qty: 54 ML | Refills: 3 | Status: SHIPPED | OUTPATIENT
Start: 2023-12-22 | End: 2024-12-21

## 2023-12-22 ASSESSMENT — ACTIVITIES OF DAILY LIVING (ADL)
TAKING_MEDICATION: INDEPENDENT
DOING_HOUSEWORK: INDEPENDENT
DRESSING: INDEPENDENT
BATHING: INDEPENDENT
MANAGING_FINANCES: INDEPENDENT
GROCERY_SHOPPING: INDEPENDENT

## 2023-12-22 ASSESSMENT — ENCOUNTER SYMPTOMS
DEPRESSION: 0
OCCASIONAL FEELINGS OF UNSTEADINESS: 0
LOSS OF SENSATION IN FEET: 1

## 2023-12-22 ASSESSMENT — PATIENT HEALTH QUESTIONNAIRE - PHQ9
2. FEELING DOWN, DEPRESSED OR HOPELESS: NOT AT ALL
SUM OF ALL RESPONSES TO PHQ9 QUESTIONS 1 AND 2: 0
1. LITTLE INTEREST OR PLEASURE IN DOING THINGS: NOT AT ALL

## 2023-12-22 NOTE — PROGRESS NOTES
"Subjective   Reason for Visit: Shankar Oswald is an 78 y.o. male here for a Medicare Wellness visit.     Past Medical, Surgical, and Family History reviewed and updated in chart.    Reviewed all medications by prescribing practitioner or clinical pharmacist (such as prescriptions, OTCs, herbal therapies and supplements) and documented in the medical record.    HPI fastings are typically around 110 his 30-day average is one 4090-day average 150  Heart disease managed by [facility], no hospitalizations.  He is limited and walking capacity from a both cardiac and musculoskeletal basis.  Walking across the store to the restroom he has to stop 2 times because of hip pain.  Cardiology is referring to pulmonary to make sure there is not a contribution  Hyperlipidemia- on is on P CS K9 inhibitor and prudent diet  HTN-Takes and tolerates meds without side effects. No alcohol. + tobacco. no exercise. low salt.  Reviewed recommendation for 150 minutes of exercise per week including 2 days of weight training if over age 50    Patient Care Team:  Bradford Wheeler MD as PCP - General  Bradford Wheeler MD [facility]     Review of Systems  As per HPI  Objective   Vitals:  /76   Pulse 71   Ht 1.778 m (5' 10\")   Wt 123 kg (271 lb 4.8 oz)   SpO2 96%   BMI 38.93 kg/m²       Physical Exam  Lungs clear to auscultation.  Heart is regular.  No edema in extremities.  No carotid bruit.  Thyroid palpates normal.  Assessment/Plan   Problem List Items Addressed This Visit       Diabetes mellitus, type 2 (CMS/[facility])    Relevant Medications    insulin glargine (Basaglar KwikPen U-100 Insulin) 100 unit/mL (3 mL) pen    Other Relevant Orders    Hemoglobin A1C    Follow Up In Primary Care    Hyperlipidemia    Overview     Formatting of this note might be different from the original. intolerant to statins Last Assessment & Plan: Formatting of this note might be different from the original. Apparently statin " intolerant.  Would recommend a PCSK9 inhibitor.         Hypertension    Overview     Last Assessment & Plan: Formatting of this note might be different from the original. Patient is on BB, ACE, nitrate and diuretic.          Other Visit Diagnoses       Routine general medical examination at health care facility    -  Primary

## 2024-02-16 ENCOUNTER — PATIENT OUTREACH (OUTPATIENT)
Dept: CARE COORDINATION | Facility: CLINIC | Age: 79
End: 2024-02-16
Payer: MEDICARE

## 2024-02-16 DIAGNOSIS — K52.9 GASTROENTERITIS: ICD-10-CM

## 2024-02-16 NOTE — PROGRESS NOTES
Discharge Facility:[facility]  Discharge Diagnosis:Gastroenteritis  Admission Date:2.14.24  Discharge Date: 2.15.24    PCP Appointment Date:2.26.24  Specialist Appointment Date:   Hospital Encounter and Summary: Linked   See discharge assessment below for further details  Engagement  Call Start Time: 1157 (2/16/2024 11:57 AM)    Medications  Medications reviewed with patient/caregiver?: Yes (2/16/2024 11:57 AM)  Is the patient having any side effects they believe may be caused by any medication additions or changes?: No (2/16/2024 11:57 AM)  Does the patient have all medications ordered at discharge?: Yes (2/16/2024 11:57 AM)  Care Management Interventions: No intervention needed (2/16/2024 11:57 AM)  Is the patient taking all medications as directed (includes completed medication regime)?: Yes (2/16/2024 11:57 AM)  Care Management Interventions: Provided patient education (2/16/2024 11:57 AM)    Appointments  Does the patient have a primary care provider?: Yes (2/16/2024 11:57 AM)  Care Management Interventions: Verified appointment date/time/provider (2/16/2024 11:57 AM)  Has the patient kept scheduled appointments due by today?: Yes (2/16/2024 11:57 AM)  Care Management Interventions: Advised patient to keep appointment (2/16/2024 11:57 AM)    Self Management  What is the home health agency?: n/a (2/16/2024 11:57 AM)  What Durable Medical Equipment (DME) was ordered?: n/a (2/16/2024 11:57 AM)    Patient Teaching  Does the patient have access to their discharge instructions?: Yes (2/16/2024 11:57 AM)  Care Management Interventions: Reviewed instructions with patient (2/16/2024 11:57 AM)  What is the patient's perception of their health status since discharge?: Improving (2/16/2024 11:57 AM)  Patient/Caregiver Education Comments: Spoke with patient. Feeling better. Eating a bologna sandwich. Denies nausea and vomiting and vrbalized understanding of new Bumex dosing. Set up follow up appt with PCP.  (2/16/2024 11:57 AM)

## 2024-02-26 ENCOUNTER — OFFICE VISIT (OUTPATIENT)
Dept: PRIMARY CARE | Facility: CLINIC | Age: 79
End: 2024-02-26
Payer: MEDICARE

## 2024-02-26 VITALS
HEIGHT: 70 IN | BODY MASS INDEX: 38.45 KG/M2 | DIASTOLIC BLOOD PRESSURE: 60 MMHG | WEIGHT: 268.6 LBS | OXYGEN SATURATION: 97 % | SYSTOLIC BLOOD PRESSURE: 122 MMHG | HEART RATE: 71 BPM

## 2024-02-26 DIAGNOSIS — R19.7 DIARRHEA, UNSPECIFIED TYPE: Primary | ICD-10-CM

## 2024-02-26 DIAGNOSIS — E66.01 MORBID OBESITY (MULTI): ICD-10-CM

## 2024-02-26 DIAGNOSIS — I25.119 ATHEROSCLEROSIS OF NATIVE CORONARY ARTERY OF NATIVE HEART WITH ANGINA PECTORIS (CMS-HCC): ICD-10-CM

## 2024-02-26 DIAGNOSIS — I48.0 PAROXYSMAL ATRIAL FIBRILLATION (MULTI): ICD-10-CM

## 2024-02-26 DIAGNOSIS — I50.22 CHRONIC SYSTOLIC HEART FAILURE (MULTI): ICD-10-CM

## 2024-02-26 DIAGNOSIS — J44.0 CHRONIC OBSTRUCTIVE PULMONARY DISEASE WITH (ACUTE) LOWER RESPIRATORY INFECTION (MULTI): ICD-10-CM

## 2024-02-26 DIAGNOSIS — C61 MALIGNANT NEOPLASM OF PROSTATE (MULTI): ICD-10-CM

## 2024-02-26 DIAGNOSIS — E11.37X1 TYPE 2 DIABETES MELLITUS WITH DIABETIC MACULAR EDEMA OF RIGHT EYE RESOLVED AFTER TREATMENT, WITH LONG-TERM CURRENT USE OF INSULIN (MULTI): ICD-10-CM

## 2024-02-26 DIAGNOSIS — Z79.4 TYPE 2 DIABETES MELLITUS WITH DIABETIC MACULAR EDEMA OF RIGHT EYE RESOLVED AFTER TREATMENT, WITH LONG-TERM CURRENT USE OF INSULIN (MULTI): ICD-10-CM

## 2024-02-26 PROCEDURE — 3078F DIAST BP <80 MM HG: CPT | Performed by: FAMILY MEDICINE

## 2024-02-26 PROCEDURE — 99213 OFFICE O/P EST LOW 20 MIN: CPT | Performed by: FAMILY MEDICINE

## 2024-02-26 PROCEDURE — 3074F SYST BP LT 130 MM HG: CPT | Performed by: FAMILY MEDICINE

## 2024-02-26 PROCEDURE — 1160F RVW MEDS BY RX/DR IN RCRD: CPT | Performed by: FAMILY MEDICINE

## 2024-02-26 PROCEDURE — 1159F MED LIST DOCD IN RCRD: CPT | Performed by: FAMILY MEDICINE

## 2024-02-26 NOTE — PROGRESS NOTES
"Subjective   Patient ID: Shankar Oswald is a 78 y.o. male who presents for Hospital Follow-up.    HPI   In er on 2-14 for diarrhea, now better  Has seen pulmonary  Reviewed hospital record regarding diarrhea which is since resolved  HCC is opened and closed as they are all stable and attended to by multiple cardiologists  Review of Systems    Objective   /60   Pulse 71   Ht 1.778 m (5' 10\")   Wt 122 kg (268 lb 9.6 oz)   SpO2 97%   BMI 38.54 kg/m²     Physical Exam  Heart regular.  Lungs clear.  Assessment/Plan   Problem List Items Addressed This Visit             ICD-10-CM    Atherosclerosis of native coronary artery of native heart with angina pectoris (CMS/[facility]) I25.119    Paroxysmal atrial fibrillation (CMS/[facility]) I48.0    Diabetes mellitus, type 2 (CMS/HCC) E11.9    Malignant neoplasm of prostate (CMS/HCC) C61    Chronic systolic heart failure (CMS/[facility]) I50.22    Chronic obstructive pulmonary disease with (acute) lower respiratory infection (CMS/[facility]) J44.0    Morbid obesity (CMS/[facility]) E66.01     Other Visit Diagnoses         Codes    Diarrhea, unspecified type    -  Primary R19.7               "

## 2024-03-06 ENCOUNTER — PATIENT OUTREACH (OUTPATIENT)
Dept: CARE COORDINATION | Facility: CLINIC | Age: 79
End: 2024-03-06
Payer: MEDICARE

## 2024-03-06 NOTE — PROGRESS NOTES
Unable to reach patient for call back after patient's follow up appointment with PCP.  LVM  with call back number for patient to call if needed.

## 2024-03-22 ENCOUNTER — LAB (OUTPATIENT)
Dept: LAB | Facility: LAB | Age: 79
End: 2024-03-22
Payer: MEDICARE

## 2024-03-22 DIAGNOSIS — Z79.4 TYPE 2 DIABETES MELLITUS WITH DIABETIC MACULAR EDEMA OF RIGHT EYE RESOLVED AFTER TREATMENT, WITH LONG-TERM CURRENT USE OF INSULIN (MULTI): ICD-10-CM

## 2024-03-22 DIAGNOSIS — E11.37X1 TYPE 2 DIABETES MELLITUS WITH DIABETIC MACULAR EDEMA OF RIGHT EYE RESOLVED AFTER TREATMENT, WITH LONG-TERM CURRENT USE OF INSULIN (MULTI): ICD-10-CM

## 2024-03-22 LAB
EST. AVERAGE GLUCOSE BLD GHB EST-MCNC: 174 MG/DL
HBA1C MFR BLD: 7.7 %

## 2024-03-22 PROCEDURE — 83036 HEMOGLOBIN GLYCOSYLATED A1C: CPT

## 2024-03-22 PROCEDURE — 36415 COLL VENOUS BLD VENIPUNCTURE: CPT

## 2024-03-27 ASSESSMENT — ENCOUNTER SYMPTOMS
WEAKNESS: 0
SWEATS: 0
VISUAL CHANGE: 0
SPEECH DIFFICULTY: 0
SEIZURES: 0
HEADACHES: 0
DIZZINESS: 1
POLYDIPSIA: 0
FATIGUE: 1
BLACKOUTS: 0
NERVOUS/ANXIOUS: 0
WEIGHT LOSS: 0
TREMORS: 0
CONFUSION: 0
POLYPHAGIA: 0
HUNGER: 0
BLURRED VISION: 0

## 2024-03-29 ENCOUNTER — OFFICE VISIT (OUTPATIENT)
Dept: PRIMARY CARE | Facility: CLINIC | Age: 79
End: 2024-03-29
Payer: MEDICARE

## 2024-03-29 VITALS
BODY MASS INDEX: 38.42 KG/M2 | HEART RATE: 70 BPM | DIASTOLIC BLOOD PRESSURE: 80 MMHG | WEIGHT: 268.4 LBS | HEIGHT: 70 IN | SYSTOLIC BLOOD PRESSURE: 130 MMHG | OXYGEN SATURATION: 98 %

## 2024-03-29 DIAGNOSIS — C61 MALIGNANT NEOPLASM OF PROSTATE (MULTI): Primary | ICD-10-CM

## 2024-03-29 DIAGNOSIS — Z79.4 TYPE 2 DIABETES MELLITUS WITH DIABETIC MACULAR EDEMA OF RIGHT EYE RESOLVED AFTER TREATMENT, WITH LONG-TERM CURRENT USE OF INSULIN (MULTI): ICD-10-CM

## 2024-03-29 DIAGNOSIS — E11.37X1 TYPE 2 DIABETES MELLITUS WITH DIABETIC MACULAR EDEMA OF RIGHT EYE RESOLVED AFTER TREATMENT, WITH LONG-TERM CURRENT USE OF INSULIN (MULTI): ICD-10-CM

## 2024-03-29 PROCEDURE — 1160F RVW MEDS BY RX/DR IN RCRD: CPT | Performed by: FAMILY MEDICINE

## 2024-03-29 PROCEDURE — 99213 OFFICE O/P EST LOW 20 MIN: CPT | Performed by: FAMILY MEDICINE

## 2024-03-29 PROCEDURE — 3075F SYST BP GE 130 - 139MM HG: CPT | Performed by: FAMILY MEDICINE

## 2024-03-29 PROCEDURE — 3079F DIAST BP 80-89 MM HG: CPT | Performed by: FAMILY MEDICINE

## 2024-03-29 PROCEDURE — 1159F MED LIST DOCD IN RCRD: CPT | Performed by: FAMILY MEDICINE

## 2024-03-29 ASSESSMENT — ENCOUNTER SYMPTOMS
CONFUSION: 0
VISUAL CHANGE: 0
SEIZURES: 0
TREMORS: 0
BLURRED VISION: 0
DIZZINESS: 1
NERVOUS/ANXIOUS: 0
WEIGHT LOSS: 0
HEADACHES: 0
BLACKOUTS: 0
SWEATS: 0
SPEECH DIFFICULTY: 0
POLYPHAGIA: 0
HUNGER: 0
POLYDIPSIA: 0
WEAKNESS: 0
FATIGUE: 1

## 2024-03-29 NOTE — PROGRESS NOTES
Subjective   Patient ID: Shankar Oswald is a 79 y.o. male who presents for Follow-up (3 mo rev labs).    Diabetes  He has type 2 diabetes mellitus. No MedicAlert identification noted. The initial diagnosis of diabetes was made 4 years ago. Hypoglycemia symptoms include dizziness. Pertinent negatives for hypoglycemia include no confusion, headaches, hunger, mood changes, nervousness/anxiousness, pallor, seizures, sleepiness, speech difficulty, sweats or tremors. Associated symptoms include fatigue and foot paresthesias. Pertinent negatives for diabetes include no blurred vision, no chest pain, no foot ulcerations, no polydipsia, no polyphagia, no polyuria, no visual change, no weakness and no weight loss. Pertinent negatives for hypoglycemia complications include no blackouts, no hospitalization, no nocturnal hypoglycemia, no required assistance and no required glucagon injection. Symptoms are stable. Diabetic complications include impotence and peripheral neuropathy. Pertinent negatives for diabetic complications include no CVA, heart disease, nephropathy, PVD or retinopathy. Risk factors for coronary artery disease include dyslipidemia, family history, hypertension, obesity, sedentary lifestyle and tobacco exposure. Current diabetic treatment includes insulin injections and oral agent (triple therapy). He is compliant with treatment all of the time. He is currently taking insulin pre-breakfast. Insulin injections are given by patient. Rotation sites for injection include the abdominal wall and thighs. His weight is stable. He is following a generally healthy diet. Meal planning includes avoidance of concentrated sweets and carbohydrate counting. He has not had a previous visit with a dietitian. He never participates in exercise. He monitors blood glucose at home 1-2 x per day. He monitors urine at home <1 x per month. Blood glucose monitoring compliance is excellent. His home blood glucose trend is decreasing  "steadily. His breakfast blood glucose is taken between 6-7 am. His breakfast blood glucose range is generally 70-90 mg/dl. He does not see a podiatrist.Eye exam is current.      Lows to 80s,  fbsis <100 halfthe time. A1c 7.7.  low number in am if has milk at hs  60 units daily  Fbs avg 130.  Review of Systems   Constitutional:  Positive for fatigue. Negative for weight loss.   Eyes:  Negative for blurred vision.   Cardiovascular:  Negative for chest pain.   Endocrine: Negative for polydipsia, polyphagia and polyuria.   Genitourinary:  Positive for impotence.   Skin:  Negative for pallor.   Neurological:  Positive for dizziness. Negative for tremors, seizures, speech difficulty, weakness and headaches.   Psychiatric/Behavioral:  Negative for confusion. The patient is not nervous/anxious.      Stable reduced exercise tolerance and capacity  Objective   /80   Pulse 70   Ht 1.778 m (5' 10\")   Wt 122 kg (268 lb 6.4 oz)   SpO2 98%   BMI 38.51 kg/m²     Physical Exam  Heart regular.  Lungs clear.  Assessment/Plan   Problem List Items Addressed This Visit             ICD-10-CM    Diabetes mellitus, type 2 (CMS/HCC) E11.9    Relevant Orders    CBC    Comprehensive Metabolic Panel    Lipid Panel    Follow Up In Primary Care    Malignant neoplasm of prostate (CMS/HCC) - Primary C61    Relevant Orders    PSA    Follow Up In Primary Care          "

## 2024-04-05 ENCOUNTER — PATIENT OUTREACH (OUTPATIENT)
Dept: CARE COORDINATION | Facility: CLINIC | Age: 79
End: 2024-04-05
Payer: MEDICARE

## 2024-04-25 DIAGNOSIS — Z79.4 TYPE 2 DIABETES MELLITUS WITH DIABETIC MACULAR EDEMA OF RIGHT EYE RESOLVED AFTER TREATMENT, WITH LONG-TERM CURRENT USE OF INSULIN (MULTI): ICD-10-CM

## 2024-04-25 DIAGNOSIS — E11.37X1 TYPE 2 DIABETES MELLITUS WITH DIABETIC MACULAR EDEMA OF RIGHT EYE RESOLVED AFTER TREATMENT, WITH LONG-TERM CURRENT USE OF INSULIN (MULTI): ICD-10-CM

## 2024-04-25 RX ORDER — IBUPROFEN 200 MG
CAPSULE ORAL
Qty: 200 EACH | Refills: 3 | Status: SHIPPED | OUTPATIENT
Start: 2024-04-25

## 2024-06-06 ENCOUNTER — PATIENT OUTREACH (OUTPATIENT)
Dept: CARE COORDINATION | Facility: CLINIC | Age: 79
End: 2024-06-06
Payer: MEDICARE

## 2024-06-10 DIAGNOSIS — E11.37X1 TYPE 2 DIABETES MELLITUS WITH DIABETIC MACULAR EDEMA OF RIGHT EYE RESOLVED AFTER TREATMENT, WITH LONG-TERM CURRENT USE OF INSULIN (MULTI): ICD-10-CM

## 2024-06-10 DIAGNOSIS — Z79.4 TYPE 2 DIABETES MELLITUS WITH DIABETIC MACULAR EDEMA OF RIGHT EYE RESOLVED AFTER TREATMENT, WITH LONG-TERM CURRENT USE OF INSULIN (MULTI): ICD-10-CM

## 2024-06-10 RX ORDER — PEN NEEDLE, DIABETIC 32GX 5/32"
NEEDLE, DISPOSABLE MISCELLANEOUS
COMMUNITY
Start: 2024-01-07 | End: 2024-06-10 | Stop reason: SDUPTHER

## 2024-06-10 RX ORDER — PEN NEEDLE, DIABETIC 30 GX3/16"
NEEDLE, DISPOSABLE MISCELLANEOUS
Qty: 100 EACH | Refills: 3 | Status: SHIPPED | OUTPATIENT
Start: 2024-06-10

## 2024-07-22 ENCOUNTER — LAB (OUTPATIENT)
Dept: LAB | Facility: LAB | Age: 79
End: 2024-07-22
Payer: MEDICARE

## 2024-07-22 DIAGNOSIS — E11.37X1 TYPE 2 DIABETES MELLITUS WITH DIABETIC MACULAR EDEMA OF RIGHT EYE RESOLVED AFTER TREATMENT, WITH LONG-TERM CURRENT USE OF INSULIN (MULTI): ICD-10-CM

## 2024-07-22 DIAGNOSIS — C61 MALIGNANT NEOPLASM OF PROSTATE (MULTI): ICD-10-CM

## 2024-07-22 DIAGNOSIS — Z79.4 TYPE 2 DIABETES MELLITUS WITH DIABETIC MACULAR EDEMA OF RIGHT EYE RESOLVED AFTER TREATMENT, WITH LONG-TERM CURRENT USE OF INSULIN (MULTI): ICD-10-CM

## 2024-07-22 LAB
ALBUMIN SERPL BCP-MCNC: 4.1 G/DL (ref 3.4–5)
ALP SERPL-CCNC: 47 U/L (ref 33–136)
ALT SERPL W P-5'-P-CCNC: 26 U/L (ref 10–52)
ANION GAP SERPL CALC-SCNC: 14 MMOL/L (ref 10–20)
AST SERPL W P-5'-P-CCNC: 26 U/L (ref 9–39)
BILIRUB SERPL-MCNC: 0.4 MG/DL (ref 0–1.2)
BUN SERPL-MCNC: 18 MG/DL (ref 6–23)
CALCIUM SERPL-MCNC: 9.1 MG/DL (ref 8.6–10.3)
CHLORIDE SERPL-SCNC: 103 MMOL/L (ref 98–107)
CHOLEST SERPL-MCNC: 151 MG/DL (ref 0–199)
CHOLESTEROL/HDL RATIO: 4.3
CO2 SERPL-SCNC: 24 MMOL/L (ref 21–32)
CREAT SERPL-MCNC: 1.16 MG/DL (ref 0.5–1.3)
EGFRCR SERPLBLD CKD-EPI 2021: 65 ML/MIN/1.73M*2
ERYTHROCYTE [DISTWIDTH] IN BLOOD BY AUTOMATED COUNT: 14.6 % (ref 11.5–14.5)
GLUCOSE SERPL-MCNC: 147 MG/DL (ref 74–99)
HCT VFR BLD AUTO: 50.6 % (ref 41–52)
HDLC SERPL-MCNC: 35 MG/DL
HGB BLD-MCNC: 16.4 G/DL (ref 13.5–17.5)
LDLC SERPL CALC-MCNC: ABNORMAL MG/DL
MCH RBC QN AUTO: 31.5 PG (ref 26–34)
MCHC RBC AUTO-ENTMCNC: 32.4 G/DL (ref 32–36)
MCV RBC AUTO: 97 FL (ref 80–100)
NON HDL CHOLESTEROL: 116 MG/DL (ref 0–149)
NRBC BLD-RTO: 0 /100 WBCS (ref 0–0)
PLATELET # BLD AUTO: 181 X10*3/UL (ref 150–450)
POTASSIUM SERPL-SCNC: 4 MMOL/L (ref 3.5–5.3)
PROT SERPL-MCNC: 7.1 G/DL (ref 6.4–8.2)
PSA SERPL-MCNC: 0.04 NG/ML
RBC # BLD AUTO: 5.21 X10*6/UL (ref 4.5–5.9)
SODIUM SERPL-SCNC: 137 MMOL/L (ref 136–145)
TRIGL SERPL-MCNC: 407 MG/DL (ref 0–149)
VLDL: ABNORMAL
WBC # BLD AUTO: 8.2 X10*3/UL (ref 4.4–11.3)

## 2024-07-22 PROCEDURE — 80053 COMPREHEN METABOLIC PANEL: CPT

## 2024-07-22 PROCEDURE — 85027 COMPLETE CBC AUTOMATED: CPT

## 2024-07-22 PROCEDURE — 36415 COLL VENOUS BLD VENIPUNCTURE: CPT

## 2024-07-22 PROCEDURE — 84153 ASSAY OF PSA TOTAL: CPT

## 2024-07-22 PROCEDURE — 80061 LIPID PANEL: CPT

## 2024-07-29 ENCOUNTER — LAB (OUTPATIENT)
Dept: LAB | Facility: LAB | Age: 79
End: 2024-07-29
Payer: MEDICARE

## 2024-07-29 ENCOUNTER — APPOINTMENT (OUTPATIENT)
Dept: PRIMARY CARE | Facility: CLINIC | Age: 79
End: 2024-07-29
Payer: MEDICARE

## 2024-07-29 VITALS
DIASTOLIC BLOOD PRESSURE: 66 MMHG | HEART RATE: 71 BPM | BODY MASS INDEX: 38.21 KG/M2 | HEIGHT: 70 IN | OXYGEN SATURATION: 97 % | SYSTOLIC BLOOD PRESSURE: 122 MMHG | WEIGHT: 266.9 LBS

## 2024-07-29 DIAGNOSIS — E11.37X1 TYPE 2 DIABETES MELLITUS WITH DIABETIC MACULAR EDEMA OF RIGHT EYE RESOLVED AFTER TREATMENT, WITH LONG-TERM CURRENT USE OF INSULIN (MULTI): ICD-10-CM

## 2024-07-29 DIAGNOSIS — Z79.4 TYPE 2 DIABETES MELLITUS WITH DIABETIC MACULAR EDEMA OF RIGHT EYE RESOLVED AFTER TREATMENT, WITH LONG-TERM CURRENT USE OF INSULIN (MULTI): ICD-10-CM

## 2024-07-29 DIAGNOSIS — G62.9 POLYNEUROPATHY, UNSPECIFIED: ICD-10-CM

## 2024-07-29 DIAGNOSIS — C61 MALIGNANT NEOPLASM OF PROSTATE (MULTI): ICD-10-CM

## 2024-07-29 LAB
EST. AVERAGE GLUCOSE BLD GHB EST-MCNC: 163 MG/DL
HBA1C MFR BLD: 7.3 %

## 2024-07-29 PROCEDURE — 83036 HEMOGLOBIN GLYCOSYLATED A1C: CPT

## 2024-07-29 PROCEDURE — 3074F SYST BP LT 130 MM HG: CPT | Performed by: FAMILY MEDICINE

## 2024-07-29 PROCEDURE — 3078F DIAST BP <80 MM HG: CPT | Performed by: FAMILY MEDICINE

## 2024-07-29 PROCEDURE — 99214 OFFICE O/P EST MOD 30 MIN: CPT | Performed by: FAMILY MEDICINE

## 2024-07-29 PROCEDURE — 1160F RVW MEDS BY RX/DR IN RCRD: CPT | Performed by: FAMILY MEDICINE

## 2024-07-29 PROCEDURE — 1159F MED LIST DOCD IN RCRD: CPT | Performed by: FAMILY MEDICINE

## 2024-07-29 PROCEDURE — 36415 COLL VENOUS BLD VENIPUNCTURE: CPT

## 2024-07-29 RX ORDER — GABAPENTIN 300 MG/1
600 CAPSULE ORAL 3 TIMES DAILY
Qty: 540 CAPSULE | Refills: 3 | Status: SHIPPED | OUTPATIENT
Start: 2024-07-29 | End: 2025-07-29

## 2024-07-29 NOTE — PROGRESS NOTES
"Subjective   Patient ID: Shankar sOwald is a 79 y.o. male who presents for Follow-up (6 mo rev labs).    HPI   Since the last office visit there have been no interval operations, hospitalizations, important illnesses or injuries.  Dm- needs A1c.  PN-to titrate from 600 to 900-1500.  To see dr hilda ornelas. 100% a fib. Pacer dept. Wonder if cause of red ex jayce cap  Review of Systems  Stable reduced activity tolerance and capacity  Objective   /66   Pulse 71   Ht 1.778 m (5' 10\")   Wt 121 kg (266 lb 14.4 oz)   SpO2 97%   BMI 38.30 kg/m²     Physical Exam  Heart regular.  Lungs clear.  Abdomen protuberant.  No important edema in the legs  Assessment/Plan   Problem List Items Addressed This Visit             ICD-10-CM    Diabetes mellitus, type 2 (Multi) E11.9    Relevant Orders    Hemoglobin A1C    Hemoglobin A1C    Follow Up In Primary Care    Malignant neoplasm of prostate (Multi) C61     Other Visit Diagnoses         Codes    Polyneuropathy, unspecified     G62.9    Relevant Medications    gabapentin (Neurontin) 300 mg capsule    Other Relevant Orders    Follow Up In Primary Care          Will call with A1c result     "

## 2024-09-16 ENCOUNTER — APPOINTMENT (OUTPATIENT)
Dept: URBAN - METROPOLITAN AREA CLINIC 204 | Age: 79
Setting detail: DERMATOLOGY
End: 2024-09-16

## 2024-09-16 PROCEDURE — OTHER MIPS QUALITY: OTHER

## 2024-09-16 PROCEDURE — 17000 DESTRUCT PREMALG LESION: CPT | Mod: 59

## 2024-09-16 PROCEDURE — 17110 DESTRUCT B9 LESION 1-14: CPT

## 2024-09-16 PROCEDURE — 99213 OFFICE O/P EST LOW 20 MIN: CPT | Mod: 25

## 2024-09-16 PROCEDURE — 17003 DESTRUCT PREMALG LES 2-14: CPT | Mod: 59

## 2024-09-16 NOTE — PROCEDURE: MIPS QUALITY
Quality 226: Preventive Care And Screening: Tobacco Use: Screening And Cessation Intervention: Patient screened for tobacco use, is a smoker AND received Cessation Counseling within measurement period or in the six months prior to the measurement period
Detail Level: Detailed
Quality 47: Advance Care Plan: Advance Care Planning discussed and documented; advance care plan or surrogate decision maker documented in the medical record.
Additional Notes: Documentation for MIPS  purposes only. Full Patient visit note from paper chart is available for review and also scanned in EMA chart.

## 2024-09-30 DIAGNOSIS — Z79.4 TYPE 2 DIABETES MELLITUS WITH DIABETIC MACULAR EDEMA OF RIGHT EYE RESOLVED AFTER TREATMENT, WITH LONG-TERM CURRENT USE OF INSULIN: ICD-10-CM

## 2024-09-30 DIAGNOSIS — E11.37X1 TYPE 2 DIABETES MELLITUS WITH DIABETIC MACULAR EDEMA OF RIGHT EYE RESOLVED AFTER TREATMENT, WITH LONG-TERM CURRENT USE OF INSULIN: ICD-10-CM

## 2024-09-30 DIAGNOSIS — Z00.00 ROUTINE HEALTH MAINTENANCE: ICD-10-CM

## 2024-09-30 DIAGNOSIS — K21.9 GASTROESOPHAGEAL REFLUX DISEASE, UNSPECIFIED WHETHER ESOPHAGITIS PRESENT: ICD-10-CM

## 2024-09-30 DIAGNOSIS — I10 HYPERTENSION, UNSPECIFIED TYPE: ICD-10-CM

## 2024-09-30 DIAGNOSIS — I25.119 ATHEROSCLEROSIS OF NATIVE CORONARY ARTERY OF NATIVE HEART WITH ANGINA PECTORIS: Primary | ICD-10-CM

## 2024-09-30 RX ORDER — CLOPIDOGREL BISULFATE 75 MG/1
75 TABLET ORAL DAILY
COMMUNITY
End: 2024-09-30 | Stop reason: SDUPTHER

## 2024-09-30 RX ORDER — BUMETANIDE 1 MG/1
1 TABLET ORAL DAILY
Qty: 90 TABLET | Refills: 3 | Status: SHIPPED | OUTPATIENT
Start: 2024-09-30 | End: 2025-09-30

## 2024-09-30 RX ORDER — GLIPIZIDE 5 MG/1
10 TABLET ORAL 2 TIMES DAILY
Qty: 360 TABLET | Refills: 3 | Status: SHIPPED | OUTPATIENT
Start: 2024-09-30 | End: 2025-09-30

## 2024-09-30 RX ORDER — INSULIN GLARGINE 100 [IU]/ML
60 INJECTION, SOLUTION SUBCUTANEOUS EVERY MORNING
Qty: 54 ML | Refills: 3 | Status: SHIPPED | OUTPATIENT
Start: 2024-09-30 | End: 2025-09-30

## 2024-09-30 RX ORDER — CLOPIDOGREL BISULFATE 75 MG/1
75 TABLET ORAL DAILY
Qty: 90 TABLET | Refills: 3 | Status: SHIPPED | OUTPATIENT
Start: 2024-09-30 | End: 2025-09-30

## 2024-09-30 RX ORDER — OMEPRAZOLE 20 MG/1
20 CAPSULE, DELAYED RELEASE ORAL DAILY
Qty: 90 CAPSULE | Refills: 3 | Status: SHIPPED | OUTPATIENT
Start: 2024-09-30 | End: 2025-09-30

## 2024-09-30 RX ORDER — RANOLAZINE 500 MG/1
500 TABLET, EXTENDED RELEASE ORAL EVERY 12 HOURS
Qty: 180 TABLET | Refills: 3 | Status: SHIPPED | OUTPATIENT
Start: 2024-09-30 | End: 2025-09-30

## 2024-09-30 RX ORDER — METFORMIN HYDROCHLORIDE 750 MG/1
1500 TABLET, EXTENDED RELEASE ORAL DAILY
Qty: 180 TABLET | Refills: 3 | Status: SHIPPED | OUTPATIENT
Start: 2024-09-30 | End: 2025-09-30

## 2024-09-30 RX ORDER — METOPROLOL TARTRATE 25 MG/1
25 TABLET, FILM COATED ORAL 2 TIMES DAILY
Qty: 180 TABLET | Refills: 3 | Status: SHIPPED | OUTPATIENT
Start: 2024-09-30 | End: 2025-09-30

## 2024-10-25 ENCOUNTER — LAB (OUTPATIENT)
Dept: LAB | Facility: LAB | Age: 79
End: 2024-10-25
Payer: MEDICARE

## 2024-10-25 DIAGNOSIS — Z79.4 TYPE 2 DIABETES MELLITUS WITH DIABETIC MACULAR EDEMA OF RIGHT EYE RESOLVED AFTER TREATMENT, WITH LONG-TERM CURRENT USE OF INSULIN: ICD-10-CM

## 2024-10-25 DIAGNOSIS — E11.37X1 TYPE 2 DIABETES MELLITUS WITH DIABETIC MACULAR EDEMA OF RIGHT EYE RESOLVED AFTER TREATMENT, WITH LONG-TERM CURRENT USE OF INSULIN: ICD-10-CM

## 2024-10-25 LAB
EST. AVERAGE GLUCOSE BLD GHB EST-MCNC: 143 MG/DL
HBA1C MFR BLD: 6.6 %

## 2024-10-25 PROCEDURE — 83036 HEMOGLOBIN GLYCOSYLATED A1C: CPT

## 2024-11-01 ENCOUNTER — APPOINTMENT (OUTPATIENT)
Age: 79
End: 2024-11-01
Payer: MEDICARE

## 2024-11-01 VITALS
HEIGHT: 70 IN | SYSTOLIC BLOOD PRESSURE: 148 MMHG | HEART RATE: 70 BPM | DIASTOLIC BLOOD PRESSURE: 78 MMHG | BODY MASS INDEX: 40.06 KG/M2 | OXYGEN SATURATION: 95 % | WEIGHT: 279.8 LBS

## 2024-11-01 DIAGNOSIS — G62.9 POLYNEUROPATHY, UNSPECIFIED: ICD-10-CM

## 2024-11-01 DIAGNOSIS — I48.0 PAROXYSMAL ATRIAL FIBRILLATION (MULTI): ICD-10-CM

## 2024-11-01 DIAGNOSIS — E11.37X1 TYPE 2 DIABETES MELLITUS WITH DIABETIC MACULAR EDEMA OF RIGHT EYE RESOLVED AFTER TREATMENT, WITH LONG-TERM CURRENT USE OF INSULIN: ICD-10-CM

## 2024-11-01 DIAGNOSIS — Z79.4 TYPE 2 DIABETES MELLITUS WITH DIABETIC MACULAR EDEMA OF RIGHT EYE RESOLVED AFTER TREATMENT, WITH LONG-TERM CURRENT USE OF INSULIN: ICD-10-CM

## 2024-11-01 DIAGNOSIS — I10 HYPERTENSION, UNSPECIFIED TYPE: ICD-10-CM

## 2024-11-01 PROCEDURE — 1159F MED LIST DOCD IN RCRD: CPT | Performed by: FAMILY MEDICINE

## 2024-11-01 PROCEDURE — 99214 OFFICE O/P EST MOD 30 MIN: CPT | Performed by: FAMILY MEDICINE

## 2024-11-01 PROCEDURE — 1160F RVW MEDS BY RX/DR IN RCRD: CPT | Performed by: FAMILY MEDICINE

## 2024-11-01 PROCEDURE — 3077F SYST BP >= 140 MM HG: CPT | Performed by: FAMILY MEDICINE

## 2024-11-01 PROCEDURE — 3078F DIAST BP <80 MM HG: CPT | Performed by: FAMILY MEDICINE

## 2024-11-01 RX ORDER — BUMETANIDE 1 MG/1
1 TABLET ORAL DAILY
Start: 2024-11-01 | End: 2025-11-01

## 2024-11-01 RX ORDER — EVOLOCUMAB 140 MG/ML
140 INJECTION, SOLUTION SUBCUTANEOUS
COMMUNITY

## 2024-11-01 RX ORDER — LOSARTAN POTASSIUM 25 MG/1
25 TABLET ORAL DAILY
Qty: 90 TABLET | Refills: 3 | Status: SHIPPED | OUTPATIENT
Start: 2024-11-01 | End: 2025-11-01

## 2024-11-01 RX ORDER — WARFARIN SODIUM 5 MG/1
TABLET ORAL
Qty: 60 TABLET | Refills: 11 | Status: SHIPPED | OUTPATIENT
Start: 2024-11-01

## 2024-11-01 RX ORDER — WARFARIN 2.5 MG/1
2.5 TABLET ORAL EVERY EVENING
Qty: 90 TABLET | Refills: 3 | Status: SHIPPED | OUTPATIENT
Start: 2024-11-01 | End: 2025-11-01

## 2025-01-07 DIAGNOSIS — E11.37X1 TYPE 2 DIABETES MELLITUS WITH DIABETIC MACULAR EDEMA OF RIGHT EYE RESOLVED AFTER TREATMENT, WITH LONG-TERM CURRENT USE OF INSULIN: ICD-10-CM

## 2025-01-07 DIAGNOSIS — Z79.4 TYPE 2 DIABETES MELLITUS WITH DIABETIC MACULAR EDEMA OF RIGHT EYE RESOLVED AFTER TREATMENT, WITH LONG-TERM CURRENT USE OF INSULIN: ICD-10-CM

## 2025-01-07 RX ORDER — DAPAGLIFLOZIN 10 MG/1
10 TABLET, FILM COATED ORAL DAILY
Qty: 90 TABLET | Refills: 3 | Status: SHIPPED | OUTPATIENT
Start: 2025-01-07 | End: 2026-01-07

## 2025-02-14 ENCOUNTER — APPOINTMENT (OUTPATIENT)
Age: 80
End: 2025-02-14
Payer: MEDICARE

## 2025-02-14 VITALS
WEIGHT: 284 LBS | DIASTOLIC BLOOD PRESSURE: 80 MMHG | SYSTOLIC BLOOD PRESSURE: 160 MMHG | OXYGEN SATURATION: 97 % | HEART RATE: 72 BPM | BODY MASS INDEX: 40.75 KG/M2

## 2025-02-14 DIAGNOSIS — E11.37X1 TYPE 2 DIABETES MELLITUS WITH DIABETIC MACULAR EDEMA OF RIGHT EYE RESOLVED AFTER TREATMENT, WITH LONG-TERM CURRENT USE OF INSULIN: ICD-10-CM

## 2025-02-14 DIAGNOSIS — Z00.00 ROUTINE GENERAL MEDICAL EXAMINATION AT HEALTH CARE FACILITY: Primary | ICD-10-CM

## 2025-02-14 DIAGNOSIS — C61 MALIGNANT NEOPLASM OF PROSTATE (MULTI): ICD-10-CM

## 2025-02-14 DIAGNOSIS — E66.01 MORBID OBESITY (MULTI): ICD-10-CM

## 2025-02-14 DIAGNOSIS — I48.0 PAROXYSMAL ATRIAL FIBRILLATION (MULTI): ICD-10-CM

## 2025-02-14 DIAGNOSIS — Z79.4 TYPE 2 DIABETES MELLITUS WITH DIABETIC MACULAR EDEMA OF RIGHT EYE RESOLVED AFTER TREATMENT, WITH LONG-TERM CURRENT USE OF INSULIN: ICD-10-CM

## 2025-02-14 DIAGNOSIS — I10 HYPERTENSION, UNSPECIFIED TYPE: ICD-10-CM

## 2025-02-14 DIAGNOSIS — J44.0 CHRONIC OBSTRUCTIVE PULMONARY DISEASE WITH (ACUTE) LOWER RESPIRATORY INFECTION (MULTI): ICD-10-CM

## 2025-02-14 PROCEDURE — 3077F SYST BP >= 140 MM HG: CPT | Performed by: FAMILY MEDICINE

## 2025-02-14 PROCEDURE — 99213 OFFICE O/P EST LOW 20 MIN: CPT | Performed by: FAMILY MEDICINE

## 2025-02-14 PROCEDURE — 1170F FXNL STATUS ASSESSED: CPT | Performed by: FAMILY MEDICINE

## 2025-02-14 PROCEDURE — G0439 PPPS, SUBSEQ VISIT: HCPCS | Performed by: FAMILY MEDICINE

## 2025-02-14 PROCEDURE — 1124F ACP DISCUSS-NO DSCNMKR DOCD: CPT | Performed by: FAMILY MEDICINE

## 2025-02-14 PROCEDURE — 1160F RVW MEDS BY RX/DR IN RCRD: CPT | Performed by: FAMILY MEDICINE

## 2025-02-14 PROCEDURE — 3079F DIAST BP 80-89 MM HG: CPT | Performed by: FAMILY MEDICINE

## 2025-02-14 PROCEDURE — 1159F MED LIST DOCD IN RCRD: CPT | Performed by: FAMILY MEDICINE

## 2025-02-14 ASSESSMENT — PATIENT HEALTH QUESTIONNAIRE - PHQ9
SUM OF ALL RESPONSES TO PHQ9 QUESTIONS 1 AND 2: 0
1. LITTLE INTEREST OR PLEASURE IN DOING THINGS: NOT AT ALL
2. FEELING DOWN, DEPRESSED OR HOPELESS: NOT AT ALL

## 2025-02-14 ASSESSMENT — ENCOUNTER SYMPTOMS
LOSS OF SENSATION IN FEET: 0
DEPRESSION: 0
OCCASIONAL FEELINGS OF UNSTEADINESS: 1

## 2025-02-14 ASSESSMENT — ACTIVITIES OF DAILY LIVING (ADL)
DRESSING: INDEPENDENT
BATHING: INDEPENDENT
DOING_HOUSEWORK: INDEPENDENT
GROCERY_SHOPPING: INDEPENDENT
MANAGING_FINANCES: INDEPENDENT
TAKING_MEDICATION: INDEPENDENT

## 2025-02-14 NOTE — PROGRESS NOTES
Subjective   Reason for Visit: Shankar Oswald is an 79 y.o. male here for a Medicare Wellness visit.     Past Medical, Surgical, and Family History reviewed and updated in chart.    Reviewed all medications by prescribing practitioner or clinical pharmacist (such as prescriptions, OTCs, herbal therapies and supplements) and documented in the medical record.    HPI  <Half ppd  Since the last office visit there have been no interval operations, hospitalizations, important illnesses or injuries.  Skin bx x2  Dm [id number] 200  Patient Care Team:  Bradford Wheeler MD as PCP - General  [facility] Wheeler [facility]   Jayashree ornelas, ordered pet scan, amyloid scan  Review of Systems  Continue stable reduced exercise tolerance and capacity.  Cardiopulmonary limited  Objective   Vitals:  /80   Pulse 72   Wt 129 kg (284 lb)   SpO2 97%   BMI 40.75 kg/m²       Physical Exam  Heart rate is controlled.  Lungs are clear to auscultation.  No edema in extremities.  Assessment & Plan  Type 2 diabetes mellitus with diabetic macular edema of right eye resolved after treatment, with long-term current use of insulin  Will get A1c on his way out today  Orders:    Follow Up In Primary Care    Paroxysmal atrial fibrillation (Multi)    Orders:    Follow Up In Primary Care    Hypertension, unspecified type    Orders:    Follow Up In Primary Care    Routine general medical examination at health care facility    Orders:    1 Year Follow Up In Primary Care - Wellness Exam; Future    Chronic obstructive pulmonary disease with (acute) lower respiratory infection (Multi)         Malignant neoplasm of prostate (Multi)         Morbid obesity (Multi)                 Patient was identified as a fall risk. Risk prevention instructions provided.

## 2025-02-14 NOTE — PATIENT INSTRUCTIONS

## 2025-02-15 LAB
EST. AVERAGE GLUCOSE BLD GHB EST-MCNC: 186 MG/DL
EST. AVERAGE GLUCOSE BLD GHB EST-SCNC: 10.3 MMOL/L
HBA1C MFR BLD: 8.1 % OF TOTAL HGB

## 2025-02-17 ENCOUNTER — TELEPHONE (OUTPATIENT)
Age: 80
End: 2025-02-17
Payer: MEDICARE

## 2025-02-17 NOTE — TELEPHONE ENCOUNTER
----- Message from Bradford Wheeler sent at 2/17/2025  1:07 PM EST -----  A1c at 8.1.  Would like him to be a little better.  If agreeable we would like to increase his daily basal insulin from 60 units to 66 units.    Pt notified agreed to increase of 66 units.

## 2025-02-24 ENCOUNTER — PATIENT OUTREACH (OUTPATIENT)
Age: 80
End: 2025-02-24
Payer: MEDICARE

## 2025-02-24 NOTE — PROGRESS NOTES
Discharge Facility:[place]  Discharge Diagnosis:Acute hypoxic respiratory failure  Admission Date:2/18/25  Discharge Date:2/22/25    PCP Appointment Date:No contact made. Message sent to office  Specialist Appointment Date:TBD   Hospital Encounter and Summary Linked: Yes    2 call attempts made

## 2025-03-10 DIAGNOSIS — Z79.4 TYPE 2 DIABETES MELLITUS WITH DIABETIC MACULAR EDEMA OF RIGHT EYE RESOLVED AFTER TREATMENT, WITH LONG-TERM CURRENT USE OF INSULIN: ICD-10-CM

## 2025-03-10 DIAGNOSIS — E11.37X1 TYPE 2 DIABETES MELLITUS WITH DIABETIC MACULAR EDEMA OF RIGHT EYE RESOLVED AFTER TREATMENT, WITH LONG-TERM CURRENT USE OF INSULIN: ICD-10-CM

## 2025-03-10 RX ORDER — PEN NEEDLE, DIABETIC 30 GX3/16"
NEEDLE, DISPOSABLE MISCELLANEOUS
Qty: 100 EACH | Refills: 3 | Status: SHIPPED | OUTPATIENT
Start: 2025-03-10

## 2025-03-10 RX ORDER — INSULIN GLARGINE 100 [IU]/ML
66 INJECTION, SOLUTION SUBCUTANEOUS EVERY MORNING
Qty: 60 ML | Refills: 3 | Status: SHIPPED | OUTPATIENT
Start: 2025-03-10 | End: 2026-03-10

## 2025-03-11 ENCOUNTER — PATIENT OUTREACH (OUTPATIENT)
Age: 80
End: 2025-03-11
Payer: MEDICARE

## 2025-05-12 ENCOUNTER — APPOINTMENT (OUTPATIENT)
Age: 80
End: 2025-05-12
Payer: MEDICARE

## 2025-05-12 VITALS
OXYGEN SATURATION: 97 % | DIASTOLIC BLOOD PRESSURE: 72 MMHG | BODY MASS INDEX: 40.75 KG/M2 | HEART RATE: 81 BPM | WEIGHT: 284 LBS | SYSTOLIC BLOOD PRESSURE: 144 MMHG

## 2025-05-12 DIAGNOSIS — M17.11 ARTHRITIS OF RIGHT KNEE: ICD-10-CM

## 2025-05-12 DIAGNOSIS — I48.0 PAROXYSMAL ATRIAL FIBRILLATION (MULTI): ICD-10-CM

## 2025-05-12 DIAGNOSIS — I10 HYPERTENSION, UNSPECIFIED TYPE: ICD-10-CM

## 2025-05-12 DIAGNOSIS — E11.37X1 TYPE 2 DIABETES MELLITUS WITH DIABETIC MACULAR EDEMA OF RIGHT EYE RESOLVED AFTER TREATMENT, WITH LONG-TERM CURRENT USE OF INSULIN: ICD-10-CM

## 2025-05-12 DIAGNOSIS — Z79.4 TYPE 2 DIABETES MELLITUS WITH DIABETIC MACULAR EDEMA OF RIGHT EYE RESOLVED AFTER TREATMENT, WITH LONG-TERM CURRENT USE OF INSULIN: ICD-10-CM

## 2025-05-12 PROCEDURE — 99214 OFFICE O/P EST MOD 30 MIN: CPT | Performed by: FAMILY MEDICINE

## 2025-05-12 PROCEDURE — 1160F RVW MEDS BY RX/DR IN RCRD: CPT | Performed by: FAMILY MEDICINE

## 2025-05-12 PROCEDURE — 3077F SYST BP >= 140 MM HG: CPT | Performed by: FAMILY MEDICINE

## 2025-05-12 PROCEDURE — 4004F PT TOBACCO SCREEN RCVD TLK: CPT | Performed by: FAMILY MEDICINE

## 2025-05-12 PROCEDURE — 3078F DIAST BP <80 MM HG: CPT | Performed by: FAMILY MEDICINE

## 2025-05-12 PROCEDURE — 1159F MED LIST DOCD IN RCRD: CPT | Performed by: FAMILY MEDICINE

## 2025-05-12 RX ORDER — DICLOFENAC SODIUM 10 MG/G
4 GEL TOPICAL 2 TIMES DAILY PRN
Qty: 100 G | Refills: 2 | Status: SHIPPED | OUTPATIENT
Start: 2025-05-12

## 2025-05-12 NOTE — PROGRESS NOTES
Subjective   Patient ID: Shankar Oswald is a 80 y.o. male who presents for Follow-up (3 mo).    [facility]   Recc weight loss by cardiology reviewed use of GLP-1's for both weight loss and improved glycemic control.  His wife is not eager for him to start on GLP-1  Discussed dieting and the need to reduce current glip is 5 bid, will stop and monitor bs for 2 weeks,, next will redcue insulin.  CAD-Notes decreasing activity tolerance and capacity.  Has been stable over the last couple months.  Continues to smoke 3-4 ppw.  Counseling provided  Additionally we will reduce met er 750 from 2 to 1 nd see if less gas.  Paroxysmal atrial fibrillation continues on warfarin  Osteoarthritis right knee is limiting from any activity as well as his limited cardiac unction  April 21 labs reviewed  Review of Systems  Notes decreased exercise tolerance and capacity over the last year.  Optimized by cardiology with recommendation for weight loss  Objective   /72   Pulse 81   Wt 129 kg (284 lb)   SpO2 97%   BMI 40.75 kg/m²     Physical Exam  Heart regular.  Lungs clear.  Chronic venous changes distal extremities with only trace edema  Assessment/Plan   Problem List Items Addressed This Visit           ICD-10-CM    Paroxysmal atrial fibrillation (Multi) I48.0    Diabetes mellitus, type 2 (Multi) E11.9    Relevant Orders    Follow Up In Primary Care    Hemoglobin A1C    Hypertension I10     Other Visit Diagnoses         Codes      Arthritis of right knee     M17.11    Relevant Medications    diclofenac sodium (Voltaren) 1 % gel             Patient was identified as a fall risk. Risk prevention instructions provided.

## 2025-06-02 DIAGNOSIS — I48.0 PAROXYSMAL ATRIAL FIBRILLATION (MULTI): ICD-10-CM

## 2025-06-02 RX ORDER — WARFARIN 2.5 MG/1
2.5 TABLET ORAL EVERY EVENING
Qty: 90 TABLET | Refills: 3 | Status: SHIPPED | OUTPATIENT
Start: 2025-06-02 | End: 2026-06-02

## 2025-06-17 ENCOUNTER — TELEPHONE (OUTPATIENT)
Age: 80
End: 2025-06-17

## 2025-06-17 ENCOUNTER — OFFICE VISIT (OUTPATIENT)
Age: 80
End: 2025-06-17
Payer: MEDICARE

## 2025-06-17 VITALS
HEART RATE: 72 BPM | WEIGHT: 281 LBS | DIASTOLIC BLOOD PRESSURE: 68 MMHG | OXYGEN SATURATION: 98 % | SYSTOLIC BLOOD PRESSURE: 128 MMHG | BODY MASS INDEX: 40.32 KG/M2

## 2025-06-17 DIAGNOSIS — M17.11 ARTHRITIS OF RIGHT KNEE: ICD-10-CM

## 2025-06-17 DIAGNOSIS — L03.116 CELLULITIS OF LEFT LOWER EXTREMITY: Primary | ICD-10-CM

## 2025-06-17 PROCEDURE — 99213 OFFICE O/P EST LOW 20 MIN: CPT | Performed by: FAMILY MEDICINE

## 2025-06-17 PROCEDURE — 4004F PT TOBACCO SCREEN RCVD TLK: CPT | Performed by: FAMILY MEDICINE

## 2025-06-17 PROCEDURE — 3074F SYST BP LT 130 MM HG: CPT | Performed by: FAMILY MEDICINE

## 2025-06-17 PROCEDURE — 1160F RVW MEDS BY RX/DR IN RCRD: CPT | Performed by: FAMILY MEDICINE

## 2025-06-17 PROCEDURE — 3078F DIAST BP <80 MM HG: CPT | Performed by: FAMILY MEDICINE

## 2025-06-17 PROCEDURE — 1159F MED LIST DOCD IN RCRD: CPT | Performed by: FAMILY MEDICINE

## 2025-06-17 RX ORDER — DOXYCYCLINE 100 MG/1
100 CAPSULE ORAL 2 TIMES DAILY
Qty: 20 CAPSULE | Refills: 1 | Status: SHIPPED | OUTPATIENT
Start: 2025-06-17 | End: 2025-07-07

## 2025-06-17 RX ORDER — CYCLOBENZAPRINE HCL 10 MG
10 TABLET ORAL 3 TIMES DAILY PRN
Qty: 60 TABLET | Refills: 11 | Status: SHIPPED | OUTPATIENT
Start: 2025-06-17 | End: 2026-06-17

## 2025-06-17 NOTE — PROGRESS NOTES
Subjective   Patient ID: Shankar Oswald is a 80 y.o. male who presents for Leg Swelling (L>R).    HPI   Diabetes Bs x=167, range 130-200    Was having some increased swelling now better and couple days ago he had ulcer developed and 4mm left lateral distal leg with a rent.  Reviewed keeping the edema down washing soap and water daily with bacitracin ointment with the expectation that will be healed in a week doxycycline 100 twice daily  Review of Systems  As above  Objective   /68   Pulse 72   Wt 127 kg (281 lb)   SpO2 98%   BMI 40.32 kg/m²     Physical Exam  Has a trace of edema and a 4 mm rent on the lateral aspect of the left leg in the distal third  Assessment/Plan   Problem List Items Addressed This Visit    None  Visit Diagnoses         Codes      Cellulitis of left lower extremity    -  Primary L03.116    Relevant Medications    doxycycline (Vibramycin) 100 mg capsule      Arthritis of right knee     M17.11    Relevant Medications    cyclobenzaprine (Flexeril) 10 mg tablet             Patient was identified as a fall risk. Risk prevention instructions provided.

## 2025-06-17 NOTE — PATIENT INSTRUCTIONS

## 2025-06-19 DIAGNOSIS — Z79.4 TYPE 2 DIABETES MELLITUS WITH DIABETIC MACULAR EDEMA OF RIGHT EYE RESOLVED AFTER TREATMENT, WITH LONG-TERM CURRENT USE OF INSULIN: ICD-10-CM

## 2025-06-19 DIAGNOSIS — E11.37X1 TYPE 2 DIABETES MELLITUS WITH DIABETIC MACULAR EDEMA OF RIGHT EYE RESOLVED AFTER TREATMENT, WITH LONG-TERM CURRENT USE OF INSULIN: ICD-10-CM

## 2025-06-19 RX ORDER — INSULIN GLARGINE 100 [IU]/ML
66 INJECTION, SOLUTION SUBCUTANEOUS EVERY MORNING
Qty: 60 ML | Refills: 3 | Status: SHIPPED | OUTPATIENT
Start: 2025-06-19 | End: 2026-06-19

## 2025-07-10 DIAGNOSIS — Z79.4 TYPE 2 DIABETES MELLITUS WITH DIABETIC MACULAR EDEMA OF RIGHT EYE RESOLVED AFTER TREATMENT, WITH LONG-TERM CURRENT USE OF INSULIN: ICD-10-CM

## 2025-07-10 DIAGNOSIS — E11.37X1 TYPE 2 DIABETES MELLITUS WITH DIABETIC MACULAR EDEMA OF RIGHT EYE RESOLVED AFTER TREATMENT, WITH LONG-TERM CURRENT USE OF INSULIN: ICD-10-CM

## 2025-08-01 DIAGNOSIS — E11.37X1 TYPE 2 DIABETES MELLITUS WITH DIABETIC MACULAR EDEMA OF RIGHT EYE RESOLVED AFTER TREATMENT, WITH LONG-TERM CURRENT USE OF INSULIN: ICD-10-CM

## 2025-08-01 DIAGNOSIS — Z79.4 TYPE 2 DIABETES MELLITUS WITH DIABETIC MACULAR EDEMA OF RIGHT EYE RESOLVED AFTER TREATMENT, WITH LONG-TERM CURRENT USE OF INSULIN: ICD-10-CM

## 2025-08-01 RX ORDER — LANCETS
1 EACH MISCELLANEOUS 2 TIMES DAILY
Qty: 200 EACH | Refills: 3 | Status: SHIPPED | OUTPATIENT
Start: 2025-08-01

## 2025-08-01 RX ORDER — LANCETS
1 EACH MISCELLANEOUS 2 TIMES DAILY
COMMUNITY
End: 2025-08-01 | Stop reason: SDUPTHER

## 2025-08-01 RX ORDER — IBUPROFEN 200 MG
CAPSULE ORAL
Qty: 200 EACH | Refills: 3 | Status: SHIPPED | OUTPATIENT
Start: 2025-08-01

## 2025-08-09 LAB
ALBUMIN/CREAT UR: 67 MG/G CREAT
CREAT UR-MCNC: 24 MG/DL (ref 20–320)
EST. AVERAGE GLUCOSE BLD GHB EST-MCNC: 212 MG/DL
EST. AVERAGE GLUCOSE BLD GHB EST-SCNC: 11.7 MMOL/L
HBA1C MFR BLD: 9 %
MICROALBUMIN UR-MCNC: 1.6 MG/DL

## 2025-08-15 ENCOUNTER — APPOINTMENT (OUTPATIENT)
Age: 80
End: 2025-08-15
Payer: MEDICARE

## 2025-08-15 VITALS
HEART RATE: 70 BPM | DIASTOLIC BLOOD PRESSURE: 78 MMHG | WEIGHT: 284.2 LBS | OXYGEN SATURATION: 94 % | SYSTOLIC BLOOD PRESSURE: 138 MMHG | BODY MASS INDEX: 40.69 KG/M2 | HEIGHT: 70 IN

## 2025-08-15 DIAGNOSIS — I50.22 CHRONIC SYSTOLIC HEART FAILURE: ICD-10-CM

## 2025-08-15 DIAGNOSIS — Z79.4 TYPE 2 DIABETES MELLITUS WITH DIABETIC MACULAR EDEMA OF RIGHT EYE RESOLVED AFTER TREATMENT, WITH LONG-TERM CURRENT USE OF INSULIN: Primary | ICD-10-CM

## 2025-08-15 DIAGNOSIS — E11.37X1 TYPE 2 DIABETES MELLITUS WITH DIABETIC MACULAR EDEMA OF RIGHT EYE RESOLVED AFTER TREATMENT, WITH LONG-TERM CURRENT USE OF INSULIN: Primary | ICD-10-CM

## 2025-08-15 DIAGNOSIS — M25.522 LEFT ELBOW PAIN: ICD-10-CM

## 2025-08-15 PROCEDURE — 1159F MED LIST DOCD IN RCRD: CPT | Performed by: FAMILY MEDICINE

## 2025-08-15 PROCEDURE — 3078F DIAST BP <80 MM HG: CPT | Performed by: FAMILY MEDICINE

## 2025-08-15 PROCEDURE — 1160F RVW MEDS BY RX/DR IN RCRD: CPT | Performed by: FAMILY MEDICINE

## 2025-08-15 PROCEDURE — 99214 OFFICE O/P EST MOD 30 MIN: CPT | Performed by: FAMILY MEDICINE

## 2025-08-15 PROCEDURE — 3075F SYST BP GE 130 - 139MM HG: CPT | Performed by: FAMILY MEDICINE

## 2025-08-15 RX ORDER — PREDNISONE 10 MG/1
40 TABLET ORAL DAILY
Qty: 20 TABLET | Refills: 0 | Status: SHIPPED | OUTPATIENT
Start: 2025-08-15 | End: 2025-08-20

## 2025-08-15 RX ORDER — DOXYCYCLINE 100 MG/1
100 CAPSULE ORAL 2 TIMES DAILY
Qty: 14 CAPSULE | Refills: 0 | Status: SHIPPED | OUTPATIENT
Start: 2025-08-15 | End: 2025-08-22

## 2025-08-15 ASSESSMENT — PATIENT HEALTH QUESTIONNAIRE - PHQ9
1. LITTLE INTEREST OR PLEASURE IN DOING THINGS: NOT AT ALL
SUM OF ALL RESPONSES TO PHQ9 QUESTIONS 1 AND 2: 0
2. FEELING DOWN, DEPRESSED OR HOPELESS: NOT AT ALL

## 2025-08-25 DIAGNOSIS — E11.37X1 TYPE 2 DIABETES MELLITUS WITH DIABETIC MACULAR EDEMA OF RIGHT EYE RESOLVED AFTER TREATMENT, WITH LONG-TERM CURRENT USE OF INSULIN: ICD-10-CM

## 2025-08-25 DIAGNOSIS — Z79.4 TYPE 2 DIABETES MELLITUS WITH DIABETIC MACULAR EDEMA OF RIGHT EYE RESOLVED AFTER TREATMENT, WITH LONG-TERM CURRENT USE OF INSULIN: ICD-10-CM

## 2025-08-25 RX ORDER — DAPAGLIFLOZIN 10 MG/1
10 TABLET, FILM COATED ORAL DAILY
Qty: 90 TABLET | Refills: 3 | Status: SHIPPED | OUTPATIENT
Start: 2025-08-25 | End: 2026-08-25

## 2025-08-29 ENCOUNTER — HOSPITAL ENCOUNTER (EMERGENCY)
Facility: HOSPITAL | Age: 80
Discharge: HOME | End: 2025-08-29
Payer: MEDICARE

## 2025-08-29 ENCOUNTER — APPOINTMENT (OUTPATIENT)
Dept: RADIOLOGY | Facility: HOSPITAL | Age: 80
End: 2025-08-29
Payer: MEDICARE

## 2025-08-29 DIAGNOSIS — Z79.4 TYPE 2 DIABETES MELLITUS WITH DIABETIC MACULAR EDEMA OF RIGHT EYE RESOLVED AFTER TREATMENT, WITH LONG-TERM CURRENT USE OF INSULIN: ICD-10-CM

## 2025-08-29 DIAGNOSIS — M25.522 LEFT ELBOW PAIN: Primary | ICD-10-CM

## 2025-08-29 DIAGNOSIS — E11.37X1 TYPE 2 DIABETES MELLITUS WITH DIABETIC MACULAR EDEMA OF RIGHT EYE RESOLVED AFTER TREATMENT, WITH LONG-TERM CURRENT USE OF INSULIN: ICD-10-CM

## 2025-08-29 RX ORDER — INSULIN GLARGINE 100 [IU]/ML
75 INJECTION, SOLUTION SUBCUTANEOUS EVERY MORNING
Qty: 67.5 ML | Refills: 3 | Status: SHIPPED | OUTPATIENT
Start: 2025-08-29 | End: 2026-08-29

## 2025-08-29 ASSESSMENT — PAIN DESCRIPTION - DESCRIPTORS
DESCRIPTORS: SHARP;SHOOTING
DESCRIPTORS: ACHING

## 2025-08-29 ASSESSMENT — PAIN SCALES - GENERAL
PAINLEVEL_OUTOF10: 5 - MODERATE PAIN
PAINLEVEL_OUTOF10: 0 - NO PAIN
PAINLEVEL_OUTOF10: 1
PAINLEVEL_OUTOF10: 0 - NO PAIN

## 2025-08-29 ASSESSMENT — PAIN DESCRIPTION - ORIENTATION: ORIENTATION: LEFT

## 2025-08-29 ASSESSMENT — PAIN DESCRIPTION - FREQUENCY: FREQUENCY: INTERMITTENT

## 2025-08-29 ASSESSMENT — PAIN - FUNCTIONAL ASSESSMENT
PAIN_FUNCTIONAL_ASSESSMENT: 0-10

## 2025-08-29 ASSESSMENT — PAIN DESCRIPTION - LOCATION
LOCATION: ELBOW
LOCATION: HEAD

## 2025-09-08 ENCOUNTER — APPOINTMENT (OUTPATIENT)
Age: 80
End: 2025-09-08
Payer: MEDICARE

## 2025-11-17 ENCOUNTER — APPOINTMENT (OUTPATIENT)
Age: 80
End: 2025-11-17
Payer: MEDICARE